# Patient Record
Sex: FEMALE | Race: WHITE | NOT HISPANIC OR LATINO | Employment: OTHER | URBAN - NONMETROPOLITAN AREA
[De-identification: names, ages, dates, MRNs, and addresses within clinical notes are randomized per-mention and may not be internally consistent; named-entity substitution may affect disease eponyms.]

---

## 2017-07-17 ENCOUNTER — TELEPHONE (OUTPATIENT)
Dept: OBSTETRICS AND GYNECOLOGY | Facility: CLINIC | Age: 67
End: 2017-07-17

## 2017-07-17 RX ORDER — BUPROPION HYDROCHLORIDE 300 MG/1
TABLET ORAL
Qty: 30 TABLET | Refills: 2 | Status: SHIPPED | OUTPATIENT
Start: 2017-07-17 | End: 2017-10-23 | Stop reason: SDUPTHER

## 2017-07-17 RX ORDER — BUPROPION HYDROCHLORIDE 300 MG/1
300 TABLET ORAL DAILY
Qty: 30 TABLET | Refills: 2 | Status: SHIPPED | OUTPATIENT
Start: 2017-07-17 | End: 2017-11-17 | Stop reason: SDUPTHER

## 2017-07-17 NOTE — TELEPHONE ENCOUNTER
----- Message from [place] sent at 7/17/2017  8:48 AM EDT -----  Contact: PT  PT REQUESTING REFILLS OF BUPROPION. PT SAYS SHE HAS TO CALL MONTHLY, REQUESTING TO RECEIVE 6 MONTH SUPPLY.     JASON  [phone]  [facility] [place]  BUPROPION

## 2017-10-23 ENCOUNTER — TELEPHONE (OUTPATIENT)
Dept: OBSTETRICS AND GYNECOLOGY | Facility: CLINIC | Age: 67
End: 2017-10-23

## 2017-10-23 NOTE — TELEPHONE ENCOUNTER
----- Message from Kerrie Ga sent at 10/23/2017  2:44 PM EDT -----  Contact: PT  PT IS SCHEDULED WITH DR CHEATHAM ON 11/17/17 FOR MED F/U.  SHE IS OUT OF WELLBUTRIN XL 300MG.  PLEASE SEND IN REFILL TO [facility] IN [place].  THANKS

## 2017-10-24 RX ORDER — BUPROPION HYDROCHLORIDE 300 MG/1
300 TABLET ORAL DAILY
Qty: 30 TABLET | Refills: 1 | Status: SHIPPED | OUTPATIENT
Start: 2017-10-24 | End: 2017-11-17 | Stop reason: SDUPTHER

## 2017-11-17 ENCOUNTER — OFFICE VISIT (OUTPATIENT)
Dept: OBSTETRICS AND GYNECOLOGY | Facility: CLINIC | Age: 67
End: 2017-11-17

## 2017-11-17 VITALS
WEIGHT: 144 LBS | DIASTOLIC BLOOD PRESSURE: 70 MMHG | HEIGHT: 63 IN | SYSTOLIC BLOOD PRESSURE: 132 MMHG | BODY MASS INDEX: 25.52 KG/M2

## 2017-11-17 DIAGNOSIS — N95.1 MENOPAUSAL SYMPTOMS: ICD-10-CM

## 2017-11-17 DIAGNOSIS — R53.82 CHRONIC FATIGUE: ICD-10-CM

## 2017-11-17 DIAGNOSIS — Z01.419 ENCOUNTER FOR GYNECOLOGICAL EXAMINATION WITHOUT ABNORMAL FINDING: ICD-10-CM

## 2017-11-17 DIAGNOSIS — L65.9 ALOPECIA: ICD-10-CM

## 2017-11-17 DIAGNOSIS — Z12.31 ENCOUNTER FOR SCREENING MAMMOGRAM FOR BREAST CANCER: Primary | ICD-10-CM

## 2017-11-17 PROCEDURE — 99397 PER PM REEVAL EST PAT 65+ YR: CPT | Performed by: OBSTETRICS & GYNECOLOGY

## 2017-11-17 RX ORDER — ESTRADIOL 0.1 MG/G
2 CREAM VAGINAL DAILY
COMMUNITY
End: 2021-06-16 | Stop reason: SDUPTHER

## 2017-11-17 RX ORDER — SIMVASTATIN 20 MG
TABLET ORAL
Refills: 0 | COMMUNITY
Start: 2017-09-19 | End: 2018-08-30

## 2017-11-17 RX ORDER — BUPROPION HYDROCHLORIDE 300 MG/1
300 TABLET ORAL DAILY
Qty: 90 TABLET | Refills: 3 | Status: SHIPPED | OUTPATIENT
Start: 2017-11-17 | End: 2018-08-30

## 2017-11-17 NOTE — PROGRESS NOTES
Subjective  Chief Complaint   Patient presents with   • Gynecologic Exam     Patient needs refill on Buproprion.      Patient is 67 y.o.  here for annual examination.  Pt had pap smear last year which was normal; does not want to do this year.  Pt is due for MMG; did last year in [place] 10/31/2016; pt wants to do here this year.  Pt doing well with wellbutrin for menopausal symptoms; wants to continue.  Pt also using estrogen cream without any complaints.  Pt does have complaints of recent hair loss, fatigue.  Pt would like to have routine labs checked.  Pt see's Dr. Danielle in [place].  Pt had low vit d level in past.  Pt could not tolerate high dose vit d.  Pt participates in ovarian cancer screening at ; still has one ovary.    History  Past Medical History:   Diagnosis Date   • Diabetes    • GERD (gastroesophageal reflux disease)    • Heart disease    • Normal vaginal Papanicolaou smear in patient with history of hysterectomy 10/17/2016   • Osteoarthritis    • Osteoporosis      Current Outpatient Prescriptions on File Prior to Visit   Medication Sig Dispense Refill   • [DISCONTINUED] buPROPion XL (WELLBUTRIN XL) 300 MG 24 hr tablet Take 1 tablet by mouth Daily. 30 tablet 1   • [DISCONTINUED] buPROPion XL (WELLBUTRIN XL) 300 MG 24 hr tablet Take 1 tablet by mouth Daily. 30 tablet 2     No current facility-administered medications on file prior to visit.      No Known Allergies  Past Surgical History:   Procedure Laterality Date   • HYSTERECTOMY     • NOSE SURGERY       Family History   Problem Relation Age of Onset   • Diabetes Father    • Osteoporosis Mother      Social History     Social History   • Marital status:      Spouse name: N/A   • Number of children: N/A   • Years of education: N/A     Social History Main Topics   • Smoking status: Never Smoker   • Smokeless tobacco: Never Used   • Alcohol use No   • Drug use: No   • Sexual activity: Defer     Other Topics Concern   • None     Social  "History Narrative     Review of Systems  All systems were reviewed and negative except for:  ENT:  positive for ear ringing and hearing loss  Gastrointestinal: postitive for  constipation  Musculoskeletal: positive for  joint pain  Behavioral/Psych: positive for  unstable mood  Endocrine: positive for  hot flashes and excessive thirst     Objective  Vitals:    11/17/17 1029   BP: 132/70   Weight: 144 lb (65.3 kg)   Height: 63\" (160 cm)     Physical Exam:  General Appearance: alert, appears stated age and cooperative  Head: normocephalic, without obvious abnormality and atraumatic  Eyes: lids and lashes normal, conjunctivae and sclerae normal, no icterus, no pallor, corneas clear and PERRLA  Ears: ears appear intact with no abnormalities noted  Nose: nares normal, septum midline, mucosa normal and no drainage  Neck: suppple, trachea midline and no thyromegaly  Lungs: clear to auscultation, respirations regular, respirations even and respirations unlabored  Heart: regular rhythm and normal rate, normal S1, S2, no murmur, gallop, or rubs and no click  Breasts: Not performed; pt declines.  Abdomen: normal bowel sounds, no masses, no hepatomegaly, no splenomegaly, soft non-tender, no guarding and no rebound tenderness  Pelvic: Not performed; pt declines; had ovarian cancer screening.  Extremities: moves extremities well, no edema, no cyanosis and no redness  Skin: no bleeding, bruising or rash and no lesions noted  Lymph Nodes: no palpable adenopathy  Psych: normal mood and affect, oriented to person, time and place, thought content organized and appropriate judgment    Lab Review   No data reviewed    Imaging   Mammogram report    Assessment/Plan    Problem List Items Addressed This Visit     None      Visit Diagnoses     Encounter for screening mammogram for breast cancer    -  Primary  It is recommended per ACOG, for women at average risk to start annual mammogram screening at the age of 40 until the age of 75 and an " individualized decision be made for women after age 75.  She was encouraged to continue getting yearly mammograms.  She should report any palpable breast lump(s) or skin changes regardless of mammographic findings.  I explained to Madelaine that notification regarding her mammogram results will come from the center performing the study.  Our office will not be routinely calling with mammogram results.  It is her responsibility to make sure that the results from the mammogram are communicated to her by the breast center.  If she has any questions about the results, she is welcome to call our office anytime.  MMG ordered.    Relevant Orders    Mammo Screening Digital Tomosynthesis Bilateral With CAD    Comprehensive Metabolic Panel    Thyroid Panel With TSH    Alopecia      Order given for following labs.    Relevant Orders    Thyroid Panel With TSH    Chronic fatigue      Order given for following labs.    Relevant Medications    buPROPion XL (WELLBUTRIN XL) 300 MG 24 hr tablet    Other Relevant Orders    CBC & Differential    Comprehensive Metabolic Panel    Thyroid Panel With TSH    Encounter for gynecological examination without abnormal finding     I explained to Madelaine that Pap smears are no longer recommended in patients after 65 years of age who have had adequate negative screening with three consecutive negative pap smears within the previous 10 years and the most recent test performed within the past 5 years. Women who have a history of IZABELLA 2, IZABELLA 3, or ACIS should continue routine screening for a total of 20 years after regression or treatment.   I stressed to Madelaine that she still should be seen yearly for a full physical including breast and pelvic exam.  I informed her that women aged 65 and older still do get cervical cancer representing 14.1% of the [place] female population and 19.6% of new cases of cervical cancer.  I also informed the patient regarding medicare guidelines on coverage for pap smear  screening.  For this reason, Madelaine has declined pap smear screening this year.     Menopausal symptoms      Patient desires to continue Wellbutrin as given.  Pt also continues estrogen cream; does not need refill at this time.    Relevant Medications    estradiol (ESTRACE) 0.1 MG/GM vaginal cream    buPROPion XL (WELLBUTRIN XL) 300 MG 24 hr tablet            Follow up 1 year or prn     This note was electronically signed.  Lu Dent M.D.

## 2018-08-30 ENCOUNTER — OFFICE VISIT (OUTPATIENT)
Dept: OBSTETRICS AND GYNECOLOGY | Facility: CLINIC | Age: 68
End: 2018-08-30

## 2018-08-30 VITALS
BODY MASS INDEX: 24.1 KG/M2 | WEIGHT: 136 LBS | DIASTOLIC BLOOD PRESSURE: 74 MMHG | SYSTOLIC BLOOD PRESSURE: 130 MMHG | HEIGHT: 63 IN

## 2018-08-30 DIAGNOSIS — R63.0 LOSS OF APPETITE: ICD-10-CM

## 2018-08-30 DIAGNOSIS — Z86.39 PERSONAL HISTORY OF OTHER ENDOCRINE, NUTRITIONAL AND METABOLIC DISEASE: ICD-10-CM

## 2018-08-30 DIAGNOSIS — L65.9 ALOPECIA: Primary | ICD-10-CM

## 2018-08-30 DIAGNOSIS — R63.4 WEIGHT LOSS: ICD-10-CM

## 2018-08-30 DIAGNOSIS — K59.00 CONSTIPATION, UNSPECIFIED CONSTIPATION TYPE: ICD-10-CM

## 2018-08-30 DIAGNOSIS — R53.83 OTHER FATIGUE: ICD-10-CM

## 2018-08-30 PROCEDURE — 99214 OFFICE O/P EST MOD 30 MIN: CPT | Performed by: OBSTETRICS & GYNECOLOGY

## 2018-08-30 RX ORDER — SIMVASTATIN 20 MG
TABLET ORAL
COMMUNITY
End: 2019-09-25

## 2018-08-30 RX ORDER — BUPROPION HYDROCHLORIDE 300 MG/1
TABLET ORAL
COMMUNITY
End: 2018-12-31 | Stop reason: SDUPTHER

## 2018-08-30 NOTE — PROGRESS NOTES
Subjective  Chief Complaint   Patient presents with   • Gynecologic Exam     PROBLEM WITH HORMONES. HAIR LOSS AND HOT FLASHES SINCE      Patient is 68 y.o.  here for evaluation of hair loss and hot flashes.  Pt was severely ill in Feb; tested positive for influenza but did not receive any medication.  Pt reports having the acute onset of hair loss in .  Pt has seen primary care physician.  Pt had labs done which are noted and reviewed today.  Pt had labs done 2018  CBC normal, MG 1.9; T4 10.80; TSH 2.85; vit B12 >1000; vit D 64.5.; 2/15/2018; WBC normal, BMP normal; cholesterol 190; ; ; TSH 4.80 (high).  Pt has also seen dermatologist.  Pt reports both primary care physician and dermatologist feel the hair loss is secondary to the severe illness.  Pt reports she was near death.  Pt has also had the onset of hot flashes during this time.  Pt has not had menses since hysterectomy.  Pt has not been on any HRT for many years and doing well.  Pt also reports during this time extreme fatigue.  Pt also reports loss of appetite and weight loss.  Pt reports having weight loss prior to illness.  Pt thinks she has lost 10-20 lbs.  Pt is on vit D; has history of low levels in the past.  Pt denies any other supplements.  Pt has not been on any vit b 12 supplements.  Pt also has complaints of constipation.  Pt reports going days without a bm.  Pt reports noticing a change with bms as well during this time.  Pt has not had a recent colonoscopy.  Pt does have a history of diverticulosis and diverticulitis.  Pt is concerned regarding diabetes as well given weight loss and fatigue.  Pt does have family history of diabetes.    History  Past Medical History:   Diagnosis Date   • Diabetes (CMS/[facility])    • GERD (gastroesophageal reflux disease)    • Heart disease    • Normal vaginal Papanicolaou smear in patient with history of hysterectomy 10/17/2016   • Osteoarthritis    • Osteoporosis      Current  "Outpatient Prescriptions on File Prior to Visit   Medication Sig Dispense Refill   • cholecalciferol (VITAMIN D3) 1000 units tablet Take 1,000 Units by mouth Daily.     • estradiol (ESTRACE) 0.1 MG/GM vaginal cream Insert 2 g into the vagina Daily.     • [DISCONTINUED] buPROPion XL (WELLBUTRIN XL) 300 MG 24 hr tablet Take 1 tablet by mouth Daily. 90 tablet 3   • [DISCONTINUED] simvastatin (ZOCOR) 20 MG tablet 1 tablet in the evening Once a day Orally 90 days  0     No current facility-administered medications on file prior to visit.      No Known Allergies  Past Surgical History:   Procedure Laterality Date   • HYSTERECTOMY     • NOSE SURGERY       Family History   Problem Relation Age of Onset   • Diabetes Father    • Osteoporosis Mother      Social History     Social History   • Marital status:      Social History Main Topics   • Smoking status: Never Smoker   • Smokeless tobacco: Never Used   • Alcohol use No   • Drug use: No   • Sexual activity: Defer     Other Topics Concern   • Not on file     Review of Systems  All systems were reviewed and negative except for:  Constitution:  positive for anorexia, fatigue, malaise and weight loss  Gastrointestinal: postitive for  constipation  Endocrine: positive for  hair changes, hot flashes and weight loss, unintended     Objective  Vitals:    08/30/18 1100   BP: 130/74   Weight: 61.7 kg (136 lb)   Height: 160 cm (63\")     Physical Exam:  General Appearance: alert, appears stated age and cooperative  Head: normocephalic, without obvious abnormality and atraumatic  Eyes: lids and lashes normal, conjunctivae and sclerae normal, no icterus, no pallor, corneas clear and PERRLA  Ears: ears appear intact with no abnormalities noted  Nose: nares normal, septum midline, mucosa normal and no drainage  Neck: suppple, trachea midline and no thyromegaly  Lungs: clear to auscultation, respirations regular, respirations even and respirations unlabored  Heart: regular rhythm and " normal rate, normal S1, S2, no murmur, gallop, or rubs and no click  Breasts: Not performed.  Abdomen: normal bowel sounds, no masses, no hepatomegaly, no splenomegaly, soft non-tender, no guarding and no rebound tenderness  Pelvic: Not performed.  Extremities: moves extremities well, no edema, no cyanosis and no redness  Skin: no bleeding, bruising or rash and no lesions noted  Lymph Nodes: no palpable adenopathy  Neuro: CN II-X grossly intact; sensation intact  Psych: normal mood and affect, oriented to person, time and place, thought content organized and appropriate judgment  Lab Review   labs as noted    Imaging   No data reviewed    Assessment/Plan  Problem List Items Addressed This Visit     None      Visit Diagnoses     Alopecia    -  Primary  Will check following labs today.  Pt did have slightly elevated TSH in Feb.  If abnormal then refer to endocrinology.  Plan pending results.    Relevant Orders    Thyroid Panel With TSH    Thyroid Peroxidase Antibody    Vitamin B12    Folate    CBC (No Diff)    Basic Metabolic Panel    Hemoglobin A1c    Follicle Stimulating Hormone    Estradiol    Testosterone, Free, Total    Other fatigue      Will obtain following labs as noted.  Plan pending results.    Relevant Orders    Thyroid Panel With TSH    Thyroid Peroxidase Antibody    Vitamin B12    Folate    CBC (No Diff)    Basic Metabolic Panel    Hemoglobin A1c    Follicle Stimulating Hormone    Estradiol    Testosterone, Free, Total    Constipation, unspecified constipation type      Labs as noted.  Pt given instructions and precautions.  If continued symptoms then refer to GI for further evaluation.    Relevant Orders    Thyroid Panel With TSH    Thyroid Peroxidase Antibody    Vitamin B12    Folate    CBC (No Diff)    Basic Metabolic Panel    Hemoglobin A1c    Follicle Stimulating Hormone    Estradiol    Testosterone, Free, Total    Weight loss      See plan above.    Relevant Orders    Thyroid Panel With TSH     Thyroid Peroxidase Antibody    Vitamin B12    Folate    CBC (No Diff)    Basic Metabolic Panel    Hemoglobin A1c    Follicle Stimulating Hormone    Estradiol    Testosterone, Free, Total    Loss of appetite      See plan above.    Relevant Orders    Thyroid Panel With TSH    Thyroid Peroxidase Antibody    Vitamin B12    Folate    CBC (No Diff)    Basic Metabolic Panel    Hemoglobin A1c    Follicle Stimulating Hormone    Estradiol    Testosterone, Free, Total    Personal history of other endocrine, nutritional and metabolic disease       Pt with previous abnormal TSH as well as vit b 12 levels.  Will repeat following labs today and plan pending results.    Relevant Orders    Hemoglobin A1c        Total time spent today with Madelaine  was 30 minutes (level 4).  Greater than 50% of the time was spent face to face coordinating and planning care, answering her questions and counseling regarding pathophysiology of her presenting problem along with plans for any diagnositc work-up and treatment.  Follow up as discussed   This note was electronically signed.  Lu Dent M.D.

## 2018-09-01 LAB
BUN SERPL-MCNC: 13 MG/DL (ref 7–20)
BUN/CREAT SERPL: 18.6 (ref 7.1–23.5)
CALCIUM SERPL-MCNC: 9.9 MG/DL (ref 8.4–10.2)
CHLORIDE SERPL-SCNC: 101 MMOL/L (ref 98–107)
CO2 SERPL-SCNC: 27 MMOL/L (ref 26–30)
CREAT SERPL-MCNC: 0.7 MG/DL (ref 0.6–1.3)
ERYTHROCYTE [DISTWIDTH] IN BLOOD BY AUTOMATED COUNT: 12.8 % (ref 11.5–14.5)
ESTRADIOL SERPL-MCNC: <5 PG/ML
FOLATE SERPL-MCNC: >20 NG/ML
FSH SERPL-ACNC: 104.3 MIU/ML
FT4I SERPL CALC-MCNC: 1.9 (ref 1.2–4.9)
GLUCOSE SERPL-MCNC: 88 MG/DL (ref 74–98)
HBA1C MFR BLD: 5.5 %
HCT VFR BLD AUTO: 38.6 % (ref 37–47)
HGB BLD-MCNC: 12.9 G/DL (ref 12–16)
MCH RBC QN AUTO: 29 PG (ref 27–31)
MCHC RBC AUTO-ENTMCNC: 33.4 G/DL (ref 30–37)
MCV RBC AUTO: 86.7 FL (ref 81–99)
PLATELET # BLD AUTO: 309 10*3/MM3 (ref 130–400)
POTASSIUM SERPL-SCNC: 4.7 MMOL/L (ref 3.5–5.1)
RBC # BLD AUTO: 4.45 10*6/MM3 (ref 4.2–5.4)
SODIUM SERPL-SCNC: 140 MMOL/L (ref 137–145)
T3RU NFR SERPL: 20 % (ref 24–39)
T4 SERPL-MCNC: 9.4 UG/DL (ref 4.5–12)
TESTOST FREE SERPL-MCNC: 0.3 PG/ML (ref 0–4.2)
TESTOST SERPL-MCNC: <3 NG/DL (ref 3–41)
THYROPEROXIDASE AB SERPL-ACNC: 16 IU/ML (ref 0–34)
TSH SERPL DL<=0.005 MIU/L-ACNC: 2.76 UIU/ML (ref 0.45–4.5)
VIT B12 SERPL-MCNC: >1000 PG/ML (ref 239–931)
WBC # BLD AUTO: 5.69 10*3/MM3 (ref 4.8–10.8)

## 2019-01-02 RX ORDER — BUPROPION HYDROCHLORIDE 300 MG/1
300 TABLET ORAL EVERY MORNING
Qty: 30 TABLET | Refills: 0 | Status: SHIPPED | OUTPATIENT
Start: 2019-01-02 | End: 2019-01-25 | Stop reason: SDUPTHER

## 2019-01-25 ENCOUNTER — OFFICE VISIT (OUTPATIENT)
Dept: OBSTETRICS AND GYNECOLOGY | Facility: CLINIC | Age: 69
End: 2019-01-25

## 2019-01-25 VITALS
BODY MASS INDEX: 24.98 KG/M2 | SYSTOLIC BLOOD PRESSURE: 126 MMHG | DIASTOLIC BLOOD PRESSURE: 66 MMHG | WEIGHT: 141 LBS | HEIGHT: 63 IN

## 2019-01-25 DIAGNOSIS — Z01.419 ENCOUNTER FOR GYNECOLOGICAL EXAMINATION WITHOUT ABNORMAL FINDING: Primary | ICD-10-CM

## 2019-01-25 DIAGNOSIS — Z12.31 ENCOUNTER FOR SCREENING MAMMOGRAM FOR BREAST CANCER: ICD-10-CM

## 2019-01-25 DIAGNOSIS — N95.1 MENOPAUSAL SYMPTOMS: ICD-10-CM

## 2019-01-25 PROCEDURE — 99397 PER PM REEVAL EST PAT 65+ YR: CPT | Performed by: OBSTETRICS & GYNECOLOGY

## 2019-01-25 RX ORDER — BUPROPION HYDROCHLORIDE 300 MG/1
300 TABLET ORAL EVERY MORNING
Qty: 90 TABLET | Refills: 3 | Status: SHIPPED | OUTPATIENT
Start: 2019-01-25 | End: 2020-01-23 | Stop reason: SDUPTHER

## 2019-01-25 NOTE — PROGRESS NOTES
Subjective  Chief Complaint   Patient presents with   • Gynecologic Exam     Patient is 68 y.o.  here for annual examination.  Patient also here for follow-up of her Wellbutrin.  The patient had her last mammogram in February of last year.  Patient did her mammogram in [place].  Patient denies any vaginal bleeding or spotting.  Patient has had previous hysterectomy.  Patient is using her estrogen cream as previously given.  Patient is also currently on Wellbutrin.  Patient reports having improvement in her moods mood swings as well as menopausal symptoms.  Patient would like to continue the current medication.  The patient had previously been seen with significant hair loss.  The patient reports this is improved since her last visit.  Patient denies any problems or changes in health.  Patient sees Dr. Danielle in [place].    History  Past Medical History:   Diagnosis Date   • Diabetes (CMS/[facility])    • GERD (gastroesophageal reflux disease)    • Heart disease    • Normal vaginal Papanicolaou smear in patient with history of hysterectomy 10/17/2016   • Osteoarthritis    • Osteoporosis      Current Outpatient Medications on File Prior to Visit   Medication Sig Dispense Refill   • cholecalciferol (VITAMIN D3) 1000 units tablet Take 1,000 Units by mouth Daily.     • estradiol (ESTRACE) 0.1 MG/GM vaginal cream Insert 2 g into the vagina Daily.     • simvastatin (ZOCOR) 20 MG tablet simvastatin 20 mg tablet       No current facility-administered medications on file prior to visit.      No Known Allergies  Past Surgical History:   Procedure Laterality Date   • HYSTERECTOMY     • NOSE SURGERY       Family History   Problem Relation Age of Onset   • Diabetes Father    • Osteoporosis Mother      Social History     Socioeconomic History   • Marital status:      Spouse name: Not on file   • Number of children: Not on file   • Years of education: Not on file   • Highest education level: Not on file   Tobacco Use   •  "Smoking status: Never Smoker   • Smokeless tobacco: Never Used   Substance and Sexual Activity   • Alcohol use: No   • Drug use: No   • Sexual activity: Defer     Review of Systems  The following systems were reviewed and negative:  constitution, eyes, ENT, respiratory, cardiovascular, gastrointestinal, genitourinary, integument, breast, hematologic / lymphatic, musculoskeletal, neurological, behavioral/psych, endocrine and allergies / immunologic     Objective  Vitals:    01/25/19 1147   BP: 126/66   Weight: 64 kg (141 lb)   Height: 160 cm (63\")     Physical Exam:  General Appearance: alert, appears stated age and cooperative  Head: normocephalic, without obvious abnormality and atraumatic  Eyes: lids and lashes normal, conjunctivae and sclerae normal, no icterus, no pallor, corneas clear and PERRLA  Ears: ears appear intact with no abnormalities noted  Nose: nares normal, septum midline, mucosa normal and no drainage  Neck: suppple, trachea midline and no thyromegaly  Lungs: clear to auscultation, respirations regular, respirations even and respirations unlabored  Heart: regular rhythm and normal rate, normal S1, S2, no murmur, gallop, or rubs and no click  Breasts: Examined in supine position  Symmetric without masses or skin dimpling  Nipples normal without inversion, lesions or discharge  There are no palpable axillary nodes  Abdomen: normal bowel sounds, no masses, no hepatomegaly, no splenomegaly, soft non-tender, no guarding and no rebound tenderness  Pelvic: Clinical staff was present for exam  External genitalia:  normal appearance of the external genitalia including Bartholin's and Devers's glands.  :  urethral meatus normal;  Vaginal:  atrophic mucosal changes are present;  Cervix:  absent.  Uterus:  absent.  Adnexa:  non palpable bilaterally.  Pap smear done and specimen sent using Thin-Prep technique  Extremities: moves extremities well, no edema, no cyanosis and no redness  Skin: no bleeding, " bruising or rash and no lesions noted  Lymph Nodes: no palpable adenopathy  Neuro: CN II-X grossly intact; sensation intact  Psych: normal mood and affect, oriented to person, time and place, thought content organized and appropriate judgment  Lab Review   No data reviewed    Imaging   Mammogram report    Assessment/Plan  Problem List Items Addressed This Visit     None      Visit Diagnoses     Encounter for gynecological examination without abnormal finding    -  Primary  I explained to Madelaine that Pap smears are no longer recommended in patients after 65 years of age who have had adequate negative screening with three consecutive negative pap smears within the previous 10 years and the most recent test performed within the past 5 years. Women who have a history of IZABELLA 2, IZABELLA 3, or ACIS should continue routine screening for a total of 20 years after regression or treatment.   I stressed to Madelaine that she still should be seen yearly for a full physical including breast and pelvic exam.  I informed her that women aged 65 and older still do get cervical cancer representing 14.1% of the [place] female population and 19.6% of new cases of cervical cancer.  I also informed the patient regarding medicare guidelines on coverage for pap smear screening.  For this reason, Madelaine has elected pap smear screening this year.    Relevant Orders    Pap IG, Rfx HPV ASCU    Encounter for screening mammogram for breast cancer      It is recommended per ACOG, for women at average risk to start annual mammogram screening at the age of 40 until the age of 75 and an individualized decision be made for women after age 75.  She was encouraged to continue getting yearly mammograms.  She should report any palpable breast lump(s) or skin changes regardless of mammographic findings.  I explained to Madelaine that notification regarding her mammogram results will come from the center performing the study.  Our office will not be routinely calling  with mammogram results.  It is her responsibility to make sure that the results from the mammogram are communicated to her by the breast center.  If she has any questions about the results, she is welcome to call our office anytime.  Pt to schedule.    Madelaine was counseled regarding having clinical breast exams and breast self-awareness.  Women aged 29-39 years of age should have clinical breast exams every 1-3 years and yearly aged 40 and older.  The patient was counseled regarding breast self-awareness focusing on having a sense of what is normal for her breasts so that she can tell if there are changes.  Even small changes should be reported to provider.    Relevant Orders    Mammo Screening Digital Tomosynthesis Bilateral With CAD    Menopausal symptoms    Improved  Pt desires to continue current medication.  rx given as noted; instructions and precautions given.    Relevant Medications    buPROPion XL (WELLBUTRIN XL) 300 MG 24 hr tablet        Follow up as discussed/scheduled  This note was electronically signed.  Lu Dent M.D.

## 2019-02-01 DIAGNOSIS — Z01.419 ENCOUNTER FOR GYNECOLOGICAL EXAMINATION WITHOUT ABNORMAL FINDING: ICD-10-CM

## 2019-03-12 ENCOUNTER — HOSPITAL ENCOUNTER (OUTPATIENT)
Dept: MAMMOGRAPHY | Facility: HOSPITAL | Age: 69
Discharge: HOME | End: 2019-03-12
Attending: OBSTETRICS & GYNECOLOGY | Admitting: OBSTETRICS & GYNECOLOGY

## 2019-03-12 DIAGNOSIS — Z12.31 ENCOUNTER FOR SCREENING MAMMOGRAM FOR BREAST CANCER: ICD-10-CM

## 2019-03-12 PROCEDURE — 77067 SCR MAMMO BI INCL CAD: CPT

## 2019-03-12 PROCEDURE — 77063 BREAST TOMOSYNTHESIS BI: CPT

## 2019-09-25 ENCOUNTER — CONSULT (OUTPATIENT)
Dept: CARDIOLOGY | Facility: CLINIC | Age: 69
End: 2019-09-25

## 2019-09-25 VITALS
HEART RATE: 90 BPM | BODY MASS INDEX: 23.74 KG/M2 | HEIGHT: 63 IN | SYSTOLIC BLOOD PRESSURE: 142 MMHG | DIASTOLIC BLOOD PRESSURE: 76 MMHG | WEIGHT: 134 LBS

## 2019-09-25 DIAGNOSIS — I10 ESSENTIAL HYPERTENSION: ICD-10-CM

## 2019-09-25 DIAGNOSIS — E78.2 MIXED HYPERLIPIDEMIA: ICD-10-CM

## 2019-09-25 DIAGNOSIS — R06.09 DYSPNEA ON EXERTION: Primary | ICD-10-CM

## 2019-09-25 PROCEDURE — 99203 OFFICE O/P NEW LOW 30 MIN: CPT | Performed by: INTERNAL MEDICINE

## 2019-09-25 RX ORDER — LISINOPRIL 10 MG/1
10 TABLET ORAL DAILY
COMMUNITY

## 2019-09-25 RX ORDER — SIMVASTATIN 10 MG
10 TABLET ORAL NIGHTLY
COMMUNITY

## 2019-09-25 NOTE — PROGRESS NOTES
[facility] at [facility]  Consultation H&P  Madelaine Brown  1950  [address] [place] [id number]     VISIT DATE:  09/25/19    PCP: Evaristo Danielle MD  [address] [place], [place] [id number]    IDENTIFICATION: A 69 y.o. 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 HTN  2. HLD, on simvastatin HDL 72, LDL 92  3. Vitamin D deficiency  4. Osteoporosis  5. OA  6. GERD  7. Depression  8. Surgical history:  1. Oophorectomy  2. Hysterectomy  3. No surgery    Allergies  No Known Allergies    Current Medications    Current Outpatient Medications:   •  buPROPion XL (WELLBUTRIN XL) 300 MG 24 hr tablet, Take 1 tablet by mouth Every Morning., Disp: 90 tablet, Rfl: 3  •  Cholecalciferol (VITAMIN D3) 5000 units tablet tablet, Take 5,000 Units by mouth Daily., Disp: , Rfl:   •  estradiol (ESTRACE) 0.1 MG/GM vaginal cream, Insert 2 g into the vagina Daily., Disp: , Rfl:   •  lisinopril (PRINIVIL,ZESTRIL) 10 MG tablet, Take 10 mg by mouth Daily., Disp: , Rfl:   •  MAGNESIUM PO, Take 330 mg by mouth Daily., Disp: , Rfl:   •  Multiple Vitamins-Minerals (HAIR SKIN AND NAILS FORMULA PO), Take  by mouth Daily., Disp: , Rfl:   •  Probiotic Product (PROBIOTIC PO), Take  by mouth Daily., Disp: , Rfl:   •  simvastatin (ZOCOR) 10 MG tablet, Take 10 mg by mouth Every Night., Disp: , Rfl:      History of Present Illness   HPI  Madelaine Brown is a 69 y.o. year old female with the above mentioned PMH who presents for consult from PCP Dr. Danielle for evaluation of HTN.    Patient most recently seen primary care provider and was noted with hypertension.  She had followed at home and ultimately initiation of ACE inhibitor was undertaken.  She states that she has changed her diet of significant decrease of sugar intake and is felt much better and had relatively good control systolic pressures of 1 10-1 50 range.  She was taking  regular nonsteroidal anti-inflammatory and decongestants form of phenylephrine but has greatly decreased such as initiation of blood pressure medication she is active with no exercise-induced symptoms that she can state.  She has a significant family history of precocious atherosclerosis and is wanting reassurance regarding her cardiovascular risk    ROS  Review of Systems   Constitution: Negative for chills, fever, weakness, malaise/fatigue, night sweats, weight gain and weight loss.   HENT: Negative for hearing loss and nosebleeds.    Eyes: Negative for blurred vision, vision loss in left eye, vision loss in right eye, visual disturbance and visual halos.   Cardiovascular: Negative for chest pain, claudication, cyanosis, dyspnea on exertion, irregular heartbeat, leg swelling, near-syncope, orthopnea, palpitations, paroxysmal nocturnal dyspnea and syncope.   Respiratory: Negative for cough, hemoptysis, shortness of breath, snoring and wheezing.    Endocrine: Negative for cold intolerance, heat intolerance, polydipsia, polyphagia and polyuria.   Hematologic/Lymphatic: Negative for adenopathy and bleeding problem. Does not bruise/bleed easily.   Skin: Negative for dry skin, poor wound healing and rash.   Musculoskeletal: Negative for falls, joint pain, joint swelling, muscle cramps, muscle weakness, myalgias and neck pain.   Gastrointestinal: Negative for bloating, abdominal pain, change in bowel habit, bowel incontinence, constipation, diarrhea, dysphagia, excessive appetite, heartburn, hematemesis, hematochezia, jaundice, melena, nausea and vomiting.   Genitourinary: Negative for bladder incontinence, dysuria, flank pain, hematuria, hesitancy and nocturia.   Neurological: Negative for aphonia, excessive daytime sleepiness, dizziness, focal weakness, headaches, light-headedness, loss of balance, seizures, sensory change, tremors and vertigo.   Psychiatric/Behavioral: Negative for altered mental status, depression,  "memory loss, substance abuse and suicidal ideas. The patient is not nervous/anxious.    All other systems reviewed and are negative.      SOCIAL HX  Social History     Socioeconomic History   • Marital status:      Spouse name: Not on file   • Number of children: Not on file   • Years of education: Not on file   • Highest education level: Not on file   Tobacco Use   • Smoking status: Never Smoker   • Smokeless tobacco: Never Used   Substance and Sexual Activity   • Alcohol use: Yes     Comment: rarely   • Drug use: No   • Sexual activity: Defer       FAMILY HX  Family History   Problem Relation Age of Onset   • Diabetes Father    • Heart attack Father    • Osteoporosis Mother        Vitals:    09/25/19 1336   BP: 142/76   BP Location: Left arm   Patient Position: Sitting   Pulse: 90   Weight: 60.8 kg (134 lb)   Height: 160 cm (63\")       PHYSICAL EXAMINATION:  Physical Exam   Constitutional: She is oriented to person, place, and time. She appears well-developed and well-nourished. No distress.   HENT:   Head: Normocephalic and atraumatic.   Nose: Nose normal.   Mouth/Throat: Uvula is midline, oropharynx is clear and moist and mucous membranes are normal.   Eyes: Conjunctivae and EOM are normal. Pupils are equal, round, and reactive to light. No scleral icterus.   Neck: Normal range of motion. Neck supple. No hepatojugular reflux and no JVD present. Carotid bruit is not present. No tracheal deviation present. No thyromegaly present.   Cardiovascular: Normal rate, regular rhythm, S1 normal, S2 normal, intact distal pulses and normal pulses. PMI is not displaced. Exam reveals no gallop, no distant heart sounds, no friction rub, no midsystolic click and no opening snap.   No murmur heard.  Pulses:       Radial pulses are 2+ on the right side, and 2+ on the left side.        Dorsalis pedis pulses are 2+ on the right side, and 2+ on the left side.        Posterior tibial pulses are 2+ on the right side, and 2+ on " the left side.   Pulmonary/Chest: Effort normal and breath sounds normal. She has no wheezes. She has no rhonchi. She has no rales.   Abdominal: Soft. Bowel sounds are normal. She exhibits no mass. There is no tenderness. There is no guarding.   Musculoskeletal: She exhibits no edema or tenderness.   Lymphadenopathy:     She has no cervical adenopathy.   Neurological: She is alert and oriented to person, place, and time.   Skin: Skin is warm, dry and intact. No rash noted. No cyanosis or erythema. Nails show no clubbing.   Psychiatric: She has a normal mood and affect. Her behavior is normal.   Nursing note and vitals reviewed.      Diagnostic Data:  Procedures  No results found for: CHLPL, TRIG, HDL, LDLDIRECT  Lab Results   Component Value Date    BUN 13 08/30/2018    CREATININE 0.70 08/30/2018     08/30/2018    K 4.7 08/30/2018     08/30/2018    CO2 27.0 08/30/2018     Lab Results   Component Value Date    HGBA1C 5.50 08/30/2018     Lab Results   Component Value Date    WBC 5.69 08/30/2018    HGB 12.9 08/30/2018    HCT 38.6 08/30/2018     08/30/2018       ASSESSMENT:   Diagnosis Plan   1. Dyspnea on exertion  Adult Transthoracic Echo Complete W/ Cont if Necessary Per Protocol   2. Mixed hyperlipidemia     3. Essential hypertension           PLAN:  Dyspnea on exertion we will document echocardiogram for filling pressure systolic diastolic function as well as screen for hypertensive heart disease    Dyslipidemia controlled on statin therapy    Hypertension controlled on low-dose lisinopril discussed pathogenesis of essential hypertension with her at this time.  Commended her on change of to anti-inflammatory diet and discontinuation of regular use of nonsteroidal anti-inflammatory and decongestant formulations    Recommendation preventive care 150 minutes of aerobic exercise weekly and reiterated the impact of lowered dementia risk with such as well    Antione Austin MD, FACC

## 2019-10-14 ENCOUNTER — APPOINTMENT (OUTPATIENT)
Dept: CARDIOLOGY | Facility: HOSPITAL | Age: 69
End: 2019-10-14

## 2019-10-16 ENCOUNTER — HOSPITAL ENCOUNTER (OUTPATIENT)
Dept: CARDIOLOGY | Facility: HOSPITAL | Age: 69
Discharge: HOME | End: 2019-10-16
Admitting: INTERNAL MEDICINE

## 2019-10-16 DIAGNOSIS — R06.09 DYSPNEA ON EXERTION: ICD-10-CM

## 2019-10-16 PROCEDURE — 93306 TTE W/DOPPLER COMPLETE: CPT

## 2019-10-16 PROCEDURE — 93306 TTE W/DOPPLER COMPLETE: CPT | Performed by: INTERNAL MEDICINE

## 2019-10-17 LAB
BH CV ECHO MEAS - AO MAX PG (FULL): 2.2 MMHG
BH CV ECHO MEAS - AO MAX PG: 6 MMHG
BH CV ECHO MEAS - AO MEAN PG (FULL): 1 MMHG
BH CV ECHO MEAS - AO MEAN PG: 3 MMHG
BH CV ECHO MEAS - AO ROOT AREA (BSA CORRECTED): 1.9
BH CV ECHO MEAS - AO ROOT AREA: 7.5 CM^2
BH CV ECHO MEAS - AO ROOT DIAM: 3.1 CM
BH CV ECHO MEAS - AO V2 MAX: 125 CM/SEC
BH CV ECHO MEAS - AO V2 MEAN: 81.2 CM/SEC
BH CV ECHO MEAS - AO V2 VTI: 24.5 CM
BH CV ECHO MEAS - ASC AORTA: 3.3 CM
BH CV ECHO MEAS - AVA(I,A): 2.1 CM^2
BH CV ECHO MEAS - AVA(I,D): 2.1 CM^2
BH CV ECHO MEAS - AVA(V,A): 2 CM^2
BH CV ECHO MEAS - AVA(V,D): 2 CM^2
BH CV ECHO MEAS - BSA(HAYCOCK): 1.7 M^2
BH CV ECHO MEAS - BSA: 1.6 M^2
BH CV ECHO MEAS - BZI_BMI: 23.9 KILOGRAMS/M^2
BH CV ECHO MEAS - BZI_METRIC_HEIGHT: 160 CM
BH CV ECHO MEAS - BZI_METRIC_WEIGHT: 61.2 KG
BH CV ECHO MEAS - EDV(CUBED): 60.2 ML
BH CV ECHO MEAS - EDV(MOD-SP2): 48 ML
BH CV ECHO MEAS - EDV(MOD-SP4): 51 ML
BH CV ECHO MEAS - EDV(TEICH): 66.7 ML
BH CV ECHO MEAS - EF(CUBED): 65.1 %
BH CV ECHO MEAS - EF(MOD-BP): 62 %
BH CV ECHO MEAS - EF(MOD-SP2): 56.3 %
BH CV ECHO MEAS - EF(MOD-SP4): 62.7 %
BH CV ECHO MEAS - EF(TEICH): 57.3 %
BH CV ECHO MEAS - ESV(CUBED): 21 ML
BH CV ECHO MEAS - ESV(MOD-SP2): 21 ML
BH CV ECHO MEAS - ESV(MOD-SP4): 19 ML
BH CV ECHO MEAS - ESV(TEICH): 28.5 ML
BH CV ECHO MEAS - FS: 29.6 %
BH CV ECHO MEAS - IVS/LVPW: 0.85
BH CV ECHO MEAS - IVSD: 0.58 CM
BH CV ECHO MEAS - LA DIMENSION: 2.8 CM
BH CV ECHO MEAS - LA/AO: 0.9
BH CV ECHO MEAS - LAD MAJOR: 3.9 CM
BH CV ECHO MEAS - LAT PEAK E' VEL: 9.6 CM/SEC
BH CV ECHO MEAS - LATERAL E/E' RATIO: 6.3
BH CV ECHO MEAS - LV DIASTOLIC VOL/BSA (35-75): 31.2 ML/M^2
BH CV ECHO MEAS - LV MASS(C)D: 65.6 GRAMS
BH CV ECHO MEAS - LV MASS(C)DI: 40.1 GRAMS/M^2
BH CV ECHO MEAS - LV MAX PG: 3.8 MMHG
BH CV ECHO MEAS - LV MEAN PG: 2 MMHG
BH CV ECHO MEAS - LV SYSTOLIC VOL/BSA (12-30): 11.6 ML/M^2
BH CV ECHO MEAS - LV V1 MAX: 97 CM/SEC
BH CV ECHO MEAS - LV V1 MEAN: 56.3 CM/SEC
BH CV ECHO MEAS - LV V1 VTI: 20 CM
BH CV ECHO MEAS - LVIDD: 3.9 CM
BH CV ECHO MEAS - LVIDS: 2.8 CM
BH CV ECHO MEAS - LVLD AP2: 6.3 CM
BH CV ECHO MEAS - LVLD AP4: 6.8 CM
BH CV ECHO MEAS - LVLS AP2: 5.3 CM
BH CV ECHO MEAS - LVLS AP4: 5.5 CM
BH CV ECHO MEAS - LVOT AREA (M): 2.5 CM^2
BH CV ECHO MEAS - LVOT AREA: 2.5 CM^2
BH CV ECHO MEAS - LVOT DIAM: 1.8 CM
BH CV ECHO MEAS - LVPWD: 0.68 CM
BH CV ECHO MEAS - MED PEAK E' VEL: 6 CM/SEC
BH CV ECHO MEAS - MEDIAL E/E' RATIO: 10
BH CV ECHO MEAS - MV A MAX VEL: 87.9 CM/SEC
BH CV ECHO MEAS - MV DEC TIME: 0.23 SEC
BH CV ECHO MEAS - MV E MAX VEL: 60.2 CM/SEC
BH CV ECHO MEAS - MV E/A: 0.68
BH CV ECHO MEAS - PA ACC SLOPE: 975 CM/SEC^2
BH CV ECHO MEAS - PA ACC TIME: 0.07 SEC
BH CV ECHO MEAS - PA PR(ACCEL): 45.7 MMHG
BH CV ECHO MEAS - PI END-D VEL: 121 CM/SEC
BH CV ECHO MEAS - RAP SYSTOLE: 3 MMHG
BH CV ECHO MEAS - RVSP: 10 MMHG
BH CV ECHO MEAS - SI(AO): 113 ML/M^2
BH CV ECHO MEAS - SI(CUBED): 24 ML/M^2
BH CV ECHO MEAS - SI(LVOT): 31.1 ML/M^2
BH CV ECHO MEAS - SI(MOD-SP2): 16.5 ML/M^2
BH CV ECHO MEAS - SI(MOD-SP4): 19.6 ML/M^2
BH CV ECHO MEAS - SI(TEICH): 23.3 ML/M^2
BH CV ECHO MEAS - SV(AO): 184.9 ML
BH CV ECHO MEAS - SV(CUBED): 39.2 ML
BH CV ECHO MEAS - SV(LVOT): 50.9 ML
BH CV ECHO MEAS - SV(MOD-SP2): 27 ML
BH CV ECHO MEAS - SV(MOD-SP4): 32 ML
BH CV ECHO MEAS - SV(TEICH): 38.2 ML
BH CV ECHO MEAS - TAPSE (>1.6): 1.7 CM2
BH CV ECHO MEAS - TR MAX PG: 7 MMHG
BH CV ECHO MEAS - TR MAX VEL: 131 CM/SEC
BH CV ECHO MEASUREMENTS AVERAGE E/E' RATIO: 7.72
LV EF 2D ECHO EST: 65 %

## 2019-10-18 ENCOUNTER — TELEPHONE (OUTPATIENT)
Dept: CARDIOLOGY | Facility: CLINIC | Age: 69
End: 2019-10-18

## 2019-10-18 NOTE — TELEPHONE ENCOUNTER
Spoke with patient regarding most recent echo results per .  Pt wants it noted in her chart that she does not want to be on blood pressure medicine at all and has made a few lifestyle changes in hopes of making her bp better. Instructed patient to keep a record of daily blood pressure and heart rate so that we can see how its trending and make  Future medication doses appropriately.

## 2019-11-22 ENCOUNTER — TELEPHONE (OUTPATIENT)
Dept: CARDIOLOGY | Facility: CLINIC | Age: 69
End: 2019-11-22

## 2019-11-22 NOTE — TELEPHONE ENCOUNTER
Patient called to report that she is having trouble with her BP medications, increased HR, and BP fluctuation. Patient states that she would like to schedule appt to see Dr. Austin to discuss these issues. Will have scheduling reach out to schedule follow up.

## 2020-01-23 ENCOUNTER — TELEPHONE (OUTPATIENT)
Dept: OBSTETRICS AND GYNECOLOGY | Facility: CLINIC | Age: 70
End: 2020-01-23

## 2020-01-23 ENCOUNTER — TRANSCRIBE ORDERS (OUTPATIENT)
Dept: ADMINISTRATIVE | Facility: HOSPITAL | Age: 70
End: 2020-01-23

## 2020-01-23 DIAGNOSIS — Z12.31 ENCOUNTER FOR SCREENING MAMMOGRAM FOR MALIGNANT NEOPLASM OF BREAST: Primary | ICD-10-CM

## 2020-01-23 DIAGNOSIS — N95.1 MENOPAUSAL SYMPTOMS: ICD-10-CM

## 2020-01-23 RX ORDER — BUPROPION HYDROCHLORIDE 300 MG/1
300 TABLET ORAL EVERY MORNING
Qty: 90 TABLET | Refills: 3 | Status: SHIPPED | OUTPATIENT
Start: 2020-01-23 | End: 2020-12-23 | Stop reason: SDUPTHER

## 2020-01-23 NOTE — TELEPHONE ENCOUNTER
----- Message from Mica Cotto sent at 1/23/2020 11:29 AM EST -----  Contact: Pt  Pt requested a refill for: Wellbutrin 300 mg.  Pt cas' her annual w/ Dr Dent.    RX: [facility]

## 2020-03-13 ENCOUNTER — APPOINTMENT (OUTPATIENT)
Dept: MAMMOGRAPHY | Facility: HOSPITAL | Age: 70
End: 2020-03-13

## 2020-03-19 ENCOUNTER — APPOINTMENT (OUTPATIENT)
Dept: MAMMOGRAPHY | Facility: HOSPITAL | Age: 70
End: 2020-03-19

## 2020-05-22 ENCOUNTER — APPOINTMENT (OUTPATIENT)
Dept: MAMMOGRAPHY | Facility: HOSPITAL | Age: 70
End: 2020-05-22

## 2020-06-24 ENCOUNTER — HOSPITAL ENCOUNTER (OUTPATIENT)
Dept: MAMMOGRAPHY | Facility: HOSPITAL | Age: 70
Discharge: HOME | End: 2020-06-24
Admitting: OBSTETRICS & GYNECOLOGY

## 2020-06-24 DIAGNOSIS — Z12.31 ENCOUNTER FOR SCREENING MAMMOGRAM FOR MALIGNANT NEOPLASM OF BREAST: ICD-10-CM

## 2020-06-24 PROCEDURE — 77067 SCR MAMMO BI INCL CAD: CPT

## 2020-06-24 PROCEDURE — 77063 BREAST TOMOSYNTHESIS BI: CPT

## 2020-12-21 RX ORDER — ESTRADIOL 0.1 MG/G
2 CREAM VAGINAL DAILY
Qty: 42.5 G | Refills: 11 | Status: CANCELLED | OUTPATIENT
Start: 2020-12-21

## 2020-12-23 ENCOUNTER — OFFICE VISIT (OUTPATIENT)
Dept: OBSTETRICS AND GYNECOLOGY | Facility: CLINIC | Age: 70
End: 2020-12-23

## 2020-12-23 VITALS
BODY MASS INDEX: 22.02 KG/M2 | DIASTOLIC BLOOD PRESSURE: 58 MMHG | WEIGHT: 137 LBS | HEIGHT: 66 IN | SYSTOLIC BLOOD PRESSURE: 114 MMHG

## 2020-12-23 DIAGNOSIS — Z12.31 ENCOUNTER FOR SCREENING MAMMOGRAM FOR BREAST CANCER: ICD-10-CM

## 2020-12-23 DIAGNOSIS — N95.2 POSTMENOPAUSAL ATROPHIC VAGINITIS: ICD-10-CM

## 2020-12-23 DIAGNOSIS — N95.1 MENOPAUSAL SYMPTOMS: ICD-10-CM

## 2020-12-23 DIAGNOSIS — R10.32 LEFT LOWER QUADRANT PAIN: ICD-10-CM

## 2020-12-23 DIAGNOSIS — Z01.419 ENCOUNTER FOR GYNECOLOGICAL EXAMINATION (GENERAL) (ROUTINE) WITHOUT ABNORMAL FINDINGS: Primary | ICD-10-CM

## 2020-12-23 PROCEDURE — 99213 OFFICE O/P EST LOW 20 MIN: CPT | Performed by: OBSTETRICS & GYNECOLOGY

## 2020-12-23 PROCEDURE — G0101 CA SCREEN;PELVIC/BREAST EXAM: HCPCS | Performed by: OBSTETRICS & GYNECOLOGY

## 2020-12-23 RX ORDER — MECLIZINE HCL 12.5 MG/1
TABLET ORAL
COMMUNITY

## 2020-12-23 RX ORDER — BUPROPION HYDROCHLORIDE 300 MG/1
300 TABLET ORAL EVERY MORNING
Qty: 90 TABLET | Refills: 3 | Status: SHIPPED | OUTPATIENT
Start: 2020-12-23 | End: 2022-03-01

## 2020-12-23 RX ORDER — FLUTICASONE PROPIONATE 50 MCG
SPRAY, SUSPENSION (ML) NASAL
COMMUNITY

## 2020-12-23 NOTE — PROGRESS NOTES
Subjective  Chief Complaint   Patient presents with   • Gynecologic Exam     Patient is 70 y.o.  here for her annual examination.  Patient had a Pap smear in January of last year.  She has had a history of a hysterectomy.  Patient still has her left ovary.  Patient desires Pap smear today.  Patient continues to use the estrogen cream for her vaginal atrophy.  Patient has been on Wellbutrin as well.  She has been on Wellbutrin for her menopausal symptoms.  She reports her symptoms are well controlled.  She still has occasional hot flashes and night sweats.  She desires to continue Wellbutrin.  Patient does have complaints of left lower quadrant pain.  She reports the pain has been intermittent in nature over the last month.  She reports having sharp stabbing pain lasting for several minutes.  She denies any fever or chills.  Patient does report having a history of constipation.  She reports worsening of her symptoms over the last year.  Patient reports her stools are hard and bulky in nature.  She denies any rectal bleeding or dark tarry stools.  She reports she has tried numerous medications without any improvement.  She has tried MiraLAX in the past without success.  Patient reports having a colonoscopy 4 years ago.  She does report having diverticulosis.  Patient reports at times she has had flareups with diverticulitis as well.  She has not had to have any hospitalization.  She denies any fever or chills.  She denies any nausea or vomiting.  Patient is concerned as she still has her left ovary.    History  Past Medical History:   Diagnosis Date   • Diabetes (CMS/[facility])    • GERD (gastroesophageal reflux disease)    • Heart disease    • Normal vaginal Papanicolaou smear in patient with history of hysterectomy 10/17/2016   • Osteoarthritis    • Osteoporosis      Current Outpatient Medications on File Prior to Visit   Medication Sig Dispense Refill   • Cholecalciferol (VITAMIN D3) 5000 units tablet tablet Take  "5,000 Units by mouth Daily.     • estradiol (ESTRACE) 0.1 MG/GM vaginal cream Insert 2 g into the vagina Daily.     • fluticasone (FLONASE) 50 MCG/ACT nasal spray fluticasone propionate 50 mcg/actuation nasal spray,suspension     • lisinopril (PRINIVIL,ZESTRIL) 10 MG tablet Take 10 mg by mouth Daily.     • MAGNESIUM PO Take 330 mg by mouth Daily.     • meclizine (ANTIVERT) 12.5 MG tablet meclizine 12.5 mg tablet     • Multiple Vitamins-Minerals (HAIR SKIN AND NAILS FORMULA PO) Take  by mouth Daily.     • Probiotic Product (PROBIOTIC PO) Take  by mouth Daily.     • simvastatin (ZOCOR) 10 MG tablet Take 10 mg by mouth Every Night.       No current facility-administered medications on file prior to visit.      No Known Allergies  Past Surgical History:   Procedure Laterality Date   • HYSTERECTOMY     • NOSE SURGERY     • OOPHORECTOMY       Family History   Problem Relation Age of Onset   • Diabetes Father    • Heart attack Father    • Osteoporosis Mother      Social History     Socioeconomic History   • Marital status:      Spouse name: Not on file   • Number of children: Not on file   • Years of education: Not on file   • Highest education level: Not on file   Tobacco Use   • Smoking status: Never Smoker   • Smokeless tobacco: Never Used   Substance and Sexual Activity   • Alcohol use: Yes     Comment: rarely   • Drug use: No   • Sexual activity: Defer       Review of Systems  The following systems were reviewed and negative:  constitution, eyes, ENT, respiratory, cardiovascular, gastrointestinal, genitourinary, integument, breast, hematologic / lymphatic, musculoskeletal, neurological, behavioral/psych, endocrine and allergies / immunologic  Objective  Vitals:    12/23/20 1448   BP: 114/58   Weight: 62.1 kg (137 lb)   Height: 167.6 cm (66\")     Physical Exam:  General Appearance: alert, appears stated age and cooperative  Head: normocephalic, without obvious abnormality and atraumatic  Eyes: lids and lashes " normal, conjunctivae and sclerae normal, no icterus, no pallor, corneas clear and PERRLA  Ears: ears appear intact with no abnormalities noted  Nose: nares normal, septum midline, mucosa normal and no drainage  Neck: suppple, trachea midline and no thyromegaly  Lungs: clear to auscultation, respirations regular, respirations even and respirations unlabored  Heart: regular rhythm and normal rate, normal S1, S2, no murmur, gallop, or rubs and no click  Breasts: Examined in supine position  Symmetric without masses or skin dimpling  Nipples normal without inversion, lesions or discharge  There are no palpable axillary nodes  Abdomen: normal bowel sounds, no masses, no hepatomegaly, no splenomegaly, soft non-tender, no guarding and no rebound tenderness  Pelvic: Clinical staff was present for exam  External genitalia:  normal appearance of the external genitalia including Bartholin's and Rosenberg's glands.  :  urethral meatus normal;  Vaginal:  atrophic mucosal changes are present;  Cervix:  absent.  Uterus:  absent.  Adnexa:  non palpable bilaterally.  Pap smear done and specimen sent using Thin-Prep technique  Extremities: moves extremities well, no edema, no cyanosis and no redness  Skin: no bleeding, bruising or rash and no lesions noted  Lymph Nodes: no palpable adenopathy  Neuro: CN II-X grossly intact; sensation intact  Psych: normal mood and affect, oriented to person, time and place, thought content organized and appropriate judgment  Lab Review   No data reviewed    Imaging   Mammogram report    Decision to Obtain Medical Records  No    Summary of Medical Records  No    Assessment/Plan  1. Encounter for gynecological examination (general) (routine) without abnormal findings  I explained to Madelaine that Pap smears are no longer recommended in patients after 65 years of age who have had adequate negative screening with three consecutive negative pap smears within the previous 10 years and the most recent test  performed within the past 5 years. Women who have a history of IZABELLA 2, IZABELLA 3, or ACIS should continue routine screening for a total of 20 years after regression or treatment.   I stressed to Madelaine that she still should be seen yearly for a full physical including breast and pelvic exam.  I informed her that women aged 65 and older still do get cervical cancer representing 14.1% of the [place] female population and 19.6% of new cases of cervical cancer.  I also informed the patient regarding medicare guidelines on coverage for pap smear screening.  For this reason, Madelaine has elected pap smear screening this year.    2. Encounter for screening mammogram for breast cancer  It is recommended per ACOG, for women at average risk to start annual mammogram screening at the age of 40 until the age of 75 and an individualized decision be made for women after age 75.  She was encouraged to continue getting yearly mammograms.  She should report any palpable breast lump(s) or skin changes regardless of mammographic findings.  I explained to Madelaine that notification regarding her mammogram results will come from the center performing the study.  Our office will not be routinely calling with mammogram results.  It is her responsibility to make sure that the results from the mammogram are communicated to her by the breast center.  If she has any questions about the results, she is welcome to call our office anytime.  The patient reports she has done her mammogram and results are noted.    Madelaine was counseled regarding having clinical breast exams and breast self-awareness.  Women aged 29-39 years of age should have clinical breast exams every 1-3 years and yearly aged 40 and older.  The patient was counseled regarding breast self-awareness focusing on having a sense of what is normal for her breasts so that she can tell if there are changes.  Even small changes should be reported to provider.    3. Menopausal  symptoms unchanged  Prescription is given for Wellbutrin today.  Instructions and precautions are given.  Patient is to call if worsening and/or no improvement in her symptoms.  - estradiol (ESTRACE) 0.1 MG/GM vaginal cream; Insert 2 g into the vagina Daily.  Dispense: 42.5 g; Refill: 11     4.  Postmenopausal atrophy   Unchanged  Patient with continued atrophic changes.  Patient is instructed to continue her estrogen cream as previously given.  Prescription is given as noted.  Instructions and precautions have been given.    5.  Left lower quadrant pain New  Patient with left lower quadrant pain as noted.  There are no abnormalities noted on examination today.  Instructions and precautions are given.  Patient is to call if worsening of her symptoms and will schedule transvaginal ultrasound for further evaluation.  The patient is also instructed this could be a flareup of her diverticulitis.  Instructions and precautions are given.  If worsening of her symptoms and/or changes as discussed then patient may need CT scan as well for further evaluation.    6.  Constipation New  Discussed various treatment options for constipation.  Pt informed in use of stool softners.  Pt also informed for need of plenty of water with 64 oz of water recommended daily.  Pt also informed regarding trial with use of Miralax 1 cap full (17 grams) mixed with 1 tablespoon of Citracel daily in 8 oz of water.  Pt to follow this with another 8 oz of water.  Instructions and precautions given.  Pt to call if no improvement in symptoms in 2-3 weeks.  Patient is also instructed if this does not work to consider prune juice on a daily or every other day basis.  Instructions and precautions are given.  Patient is to call if no improvement and/or worsening of her symptoms.      Follow up as discussed/scheduled  Note: Speech recognition transcription software may have been used to dictate portions of this document.  An attempt at proofreading has been  made though minor errors in transcription may still be present.  This note was electronically signed.  Lu Dent M.D.

## 2021-03-10 ENCOUNTER — OFFICE VISIT (OUTPATIENT)
Dept: CARDIOLOGY | Facility: CLINIC | Age: 71
End: 2021-03-10

## 2021-03-10 VITALS
DIASTOLIC BLOOD PRESSURE: 60 MMHG | SYSTOLIC BLOOD PRESSURE: 112 MMHG | BODY MASS INDEX: 23.92 KG/M2 | HEART RATE: 89 BPM | HEIGHT: 63 IN | WEIGHT: 135 LBS

## 2021-03-10 DIAGNOSIS — I44.7 LBBB (LEFT BUNDLE BRANCH BLOCK): ICD-10-CM

## 2021-03-10 DIAGNOSIS — I20.9 ANGINA PECTORIS (HCC): Primary | ICD-10-CM

## 2021-03-10 DIAGNOSIS — E78.2 MIXED HYPERLIPIDEMIA: ICD-10-CM

## 2021-03-10 DIAGNOSIS — I24.0 ACUTE CORONARY THROMBOSIS NOT RESULTING IN MYOCARDIAL INFARCTION (HCC): ICD-10-CM

## 2021-03-10 DIAGNOSIS — I10 ESSENTIAL HYPERTENSION: ICD-10-CM

## 2021-03-10 PROCEDURE — 99214 OFFICE O/P EST MOD 30 MIN: CPT | Performed by: INTERNAL MEDICINE

## 2021-03-10 NOTE — PROGRESS NOTES
[facility] at [facility]  Consultation H&P  Madelaine Brown  1950  [address] [place] [id number]     VISIT DATE:  03/10/21    PCP: Evaristo Danielle MD  [address] [place] [id number]    IDENTIFICATION: A 71 y.o. 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 HTN  2. LBBB 2021   3. HLD, on simvastatin HDL 72, LDL 92  4. Vitamin D deficiency  5. Osteoporosis  6. OA  7. GERD  8. Depression  9. Surgical history:  1. Oophorectomy  2. Hysterectomy  3. No surgery    Allergies  No Known Allergies    Current Medications    Current Outpatient Medications:   •  buPROPion XL (WELLBUTRIN XL) 300 MG 24 hr tablet, Take 1 tablet by mouth Every Morning., Disp: 90 tablet, Rfl: 3  •  Cholecalciferol (VITAMIN D3) 5000 units tablet tablet, Take 5,000 Units by mouth Daily., Disp: , Rfl:   •  estradiol (ESTRACE) 0.1 MG/GM vaginal cream, Insert 2 g into the vagina Daily., Disp: , Rfl:   •  fluticasone (FLONASE) 50 MCG/ACT nasal spray, fluticasone propionate 50 mcg/actuation nasal spray,suspension, Disp: , Rfl:   •  lisinopril (PRINIVIL,ZESTRIL) 10 MG tablet, Take 10 mg by mouth Daily., Disp: , Rfl:   •  MAGNESIUM PO, Take 330 mg by mouth Daily., Disp: , Rfl:   •  Probiotic Product (PROBIOTIC PO), Take  by mouth Daily., Disp: , Rfl:   •  simvastatin (ZOCOR) 10 MG tablet, Take 10 mg by mouth Every Night., Disp: , Rfl:   •  meclizine (ANTIVERT) 12.5 MG tablet, meclizine 12.5 mg tablet, Disp: , Rfl:   •  Multiple Vitamins-Minerals (HAIR SKIN AND NAILS FORMULA PO), Take  by mouth Daily., Disp: , Rfl:      History of Present Illness   HPI  Madelaine Brown is a 71 y.o. year old female with the above mentioned PMH who presents for consult from PCP Dr. Danielle for evaluation of HTN.  Patient has been having occasional nighttime right-sided chest discomfort.  She presented to the emergency department after talking with her primary  "care provider and having a new left bundle branch block.  She was released after 2 normal troponins with close follow-up.  She states that she is largely been inactive through the winter and Covid and is interested in traveling this spring to [place].  She is been compliant with her medications.  She cannot state that there is any postprandial presentation to such and is not related with shortness of breath and/or palpitations        Vitals:    03/10/21 1346   BP: 112/60   BP Location: Right arm   Patient Position: Sitting   Pulse: 89   Weight: 61.2 kg (135 lb)   Height: 160 cm (63\")       PHYSICAL EXAMINATION:  Physical Exam  Vitals and nursing note reviewed.   Constitutional:       General: She is not in acute distress.     Appearance: She is well-developed.   HENT:      Head: Normocephalic and atraumatic.      Nose: Nose normal.      Mouth/Throat:      Pharynx: Uvula midline.   Eyes:      General: No scleral icterus.     Conjunctiva/sclera: Conjunctivae normal.      Pupils: Pupils are equal, round, and reactive to light.   Neck:      Thyroid: No thyromegaly.      Vascular: No carotid bruit, hepatojugular reflux or JVD.      Trachea: No tracheal deviation.   Cardiovascular:      Rate and Rhythm: Normal rate and regular rhythm.      Chest Wall: PMI is not displaced.      Pulses: Normal pulses and intact distal pulses. No midsystolic click and no opening snap.           Radial pulses are 2+ on the right side and 2+ on the left side.        Dorsalis pedis pulses are 2+ on the right side and 2+ on the left side.        Posterior tibial pulses are 2+ on the right side and 2+ on the left side.      Heart sounds: S1 normal and S2 normal. Heart sounds not distant. No murmur. No friction rub. No gallop.    Pulmonary:      Effort: Pulmonary effort is normal.      Breath sounds: Normal breath sounds. No wheezing, rhonchi or rales.   Abdominal:      General: Bowel sounds are normal.      Palpations: Abdomen is soft. There is " no mass.      Tenderness: There is no abdominal tenderness. There is no guarding.   Musculoskeletal:         General: No tenderness.      Cervical back: Normal range of motion and neck supple.   Lymphadenopathy:      Cervical: No cervical adenopathy.   Skin:     General: Skin is warm and dry.      Findings: No erythema or rash.      Nails: There is no clubbing.   Neurological:      Mental Status: She is alert and oriented to person, place, and time.   Psychiatric:         Behavior: Behavior normal.         Diagnostic Data:  Procedures  No results found for: CHLPL, TRIG, HDL, LDLDIRECT  Lab Results   Component Value Date    BUN 13 08/30/2018    CREATININE 0.70 08/30/2018     08/30/2018    K 4.7 08/30/2018     08/30/2018    CO2 27.0 08/30/2018     Lab Results   Component Value Date    HGBA1C 5.50 08/30/2018     Lab Results   Component Value Date    WBC 5.69 08/30/2018    HGB 12.9 08/30/2018    HCT 38.6 08/30/2018     08/30/2018       ASSESSMENT:   Diagnosis Plan   1. Angina pectoris (CMS/HCC)     2. LBBB (left bundle branch block)     3. Essential hypertension     4. Mixed hyperlipidemia           PLAN:  Chest pain with left bundle branch block hypertensive dyslipidemic risk ratification with Lexiscan Cardiolite    Dyslipidemia controlled on statin therapy    Hypertension controlled on low-dose lisinopril    Recommendation preventive care 150 minutes of aerobic exercise weekly and reiterated the impact of lowered dementia risk with such as well    Antione Austin MD, [facility]

## 2021-03-22 ENCOUNTER — HOSPITAL ENCOUNTER (OUTPATIENT)
Dept: CARDIOLOGY | Facility: HOSPITAL | Age: 71
Discharge: HOME | End: 2021-03-22

## 2021-03-22 VITALS — HEIGHT: 63 IN | BODY MASS INDEX: 23.74 KG/M2 | WEIGHT: 134 LBS

## 2021-03-22 DIAGNOSIS — I24.0 ACUTE CORONARY THROMBOSIS NOT RESULTING IN MYOCARDIAL INFARCTION (HCC): ICD-10-CM

## 2021-03-22 DIAGNOSIS — I44.7 LBBB (LEFT BUNDLE BRANCH BLOCK): ICD-10-CM

## 2021-03-22 LAB
BH CV REST NUCLEAR ISOTOPE DOSE: 9.2 MCI
BH CV STRESS BP STAGE 2: NORMAL
BH CV STRESS BP STAGE 4: NORMAL
BH CV STRESS COMMENTS STAGE 1: NORMAL
BH CV STRESS DOSE REGADENOSON STAGE 1: 0.4
BH CV STRESS DURATION MIN STAGE 1: 1
BH CV STRESS DURATION MIN STAGE 2: 1
BH CV STRESS DURATION MIN STAGE 3: 1
BH CV STRESS DURATION MIN STAGE 4: 1
BH CV STRESS DURATION SEC STAGE 1: 0
BH CV STRESS DURATION SEC STAGE 2: 0
BH CV STRESS DURATION SEC STAGE 3: 0
BH CV STRESS DURATION SEC STAGE 4: 0
BH CV STRESS HR STAGE 1: 63
BH CV STRESS HR STAGE 2: 99
BH CV STRESS HR STAGE 3: 97
BH CV STRESS HR STAGE 4: 93
BH CV STRESS NUCLEAR ISOTOPE DOSE: 31.5 MCI
BH CV STRESS PROTOCOL 1: NORMAL
BH CV STRESS RECOVERY BP: NORMAL MMHG
BH CV STRESS RECOVERY HR: 85 BPM
BH CV STRESS STAGE 1: 1
BH CV STRESS STAGE 2: 2
BH CV STRESS STAGE 3: 3
BH CV STRESS STAGE 4: 4
LV EF NUC BP: 71 %
MAXIMAL PREDICTED HEART RATE: 150 BPM
PERCENT MAX PREDICTED HR: 68.67 %
STRESS BASELINE BP: NORMAL MMHG
STRESS BASELINE HR: 63 BPM
STRESS O2 SAT REST: 99 %
STRESS PERCENT HR: 81 %
STRESS POST PEAK BP: NORMAL MMHG
STRESS POST PEAK HR: 103 BPM
STRESS TARGET HR: 128 BPM

## 2021-03-22 PROCEDURE — 93017 CV STRESS TEST TRACING ONLY: CPT

## 2021-03-22 PROCEDURE — 93018 CV STRESS TEST I&R ONLY: CPT | Performed by: INTERNAL MEDICINE

## 2021-03-22 PROCEDURE — 78452 HT MUSCLE IMAGE SPECT MULT: CPT

## 2021-03-22 PROCEDURE — 0 TECHNETIUM SESTAMIBI: Performed by: INTERNAL MEDICINE

## 2021-03-22 PROCEDURE — 78452 HT MUSCLE IMAGE SPECT MULT: CPT | Performed by: INTERNAL MEDICINE

## 2021-03-22 PROCEDURE — A9500 TC99M SESTAMIBI: HCPCS | Performed by: INTERNAL MEDICINE

## 2021-03-22 PROCEDURE — 25010000002 REGADENOSON 0.4 MG/5ML SOLUTION: Performed by: INTERNAL MEDICINE

## 2021-03-22 RX ADMIN — REGADENOSON 0.4 MG: 0.08 INJECTION, SOLUTION INTRAVENOUS at 10:47

## 2021-03-22 RX ADMIN — TECHNETIUM TC 99M SESTAMIBI 1 DOSE: 1 INJECTION INTRAVENOUS at 10:59

## 2021-03-22 RX ADMIN — TECHNETIUM TC 99M SESTAMIBI 1 DOSE: 1 INJECTION INTRAVENOUS at 08:05

## 2021-03-23 ENCOUNTER — TELEPHONE (OUTPATIENT)
Dept: CARDIOLOGY | Facility: CLINIC | Age: 71
End: 2021-03-23

## 2021-03-23 NOTE — TELEPHONE ENCOUNTER
Spoke with patient and advised that most recent stress test per  is low risk and WNL. Pt verbalized understanding

## 2021-06-07 ENCOUNTER — TELEPHONE (OUTPATIENT)
Dept: OBSTETRICS AND GYNECOLOGY | Facility: CLINIC | Age: 71
End: 2021-06-07

## 2021-06-07 RX ORDER — FLUCONAZOLE 150 MG/1
TABLET ORAL
Qty: 2 TABLET | Refills: 0 | Status: SHIPPED | OUTPATIENT
Start: 2021-06-07

## 2021-06-07 NOTE — TELEPHONE ENCOUNTER
----- Message from Kerrie Ga sent at 6/7/2021  9:10 AM EDT -----  Regarding: RX REQUEST  Contact: PT  THIS IS DR CHEATHAM'S PT.  SHE IS MISERABLE WITH A YEAST INFECTION.  CAN WE SEND DIFLUCAN TO [facility] IN [place]?  THANKS

## 2021-06-11 ENCOUNTER — OFFICE VISIT (OUTPATIENT)
Dept: OBSTETRICS AND GYNECOLOGY | Facility: CLINIC | Age: 71
End: 2021-06-11

## 2021-06-11 VITALS
HEIGHT: 63 IN | WEIGHT: 129 LBS | DIASTOLIC BLOOD PRESSURE: 56 MMHG | BODY MASS INDEX: 22.86 KG/M2 | SYSTOLIC BLOOD PRESSURE: 116 MMHG

## 2021-06-11 DIAGNOSIS — N76.0 ACUTE VAGINITIS: ICD-10-CM

## 2021-06-11 DIAGNOSIS — R39.89 POSSIBLE URINARY TRACT INFECTION: ICD-10-CM

## 2021-06-11 DIAGNOSIS — N76.0 ACUTE VAGINITIS: Primary | ICD-10-CM

## 2021-06-11 DIAGNOSIS — N89.8 VAGINAL ITCHING: Primary | ICD-10-CM

## 2021-06-11 PROCEDURE — 99213 OFFICE O/P EST LOW 20 MIN: CPT | Performed by: PHYSICIAN ASSISTANT

## 2021-06-11 RX ORDER — FLUCONAZOLE 150 MG/1
TABLET ORAL
Qty: 2 TABLET | Refills: 0 | Status: SHIPPED | OUTPATIENT
Start: 2021-06-11

## 2021-06-11 RX ORDER — IVERMECTIN 3 MG/1
TABLET ORAL
COMMUNITY
Start: 2021-05-06

## 2021-06-11 RX ORDER — DICLOFENAC SODIUM 75 MG/1
75 TABLET, DELAYED RELEASE ORAL 2 TIMES DAILY
COMMUNITY
Start: 2021-05-06

## 2021-06-11 RX ORDER — CLOTRIMAZOLE AND BETAMETHASONE DIPROPIONATE 10; .64 MG/G; MG/G
CREAM TOPICAL
Qty: 15 G | Refills: 0 | Status: SHIPPED | OUTPATIENT
Start: 2021-06-11

## 2021-06-11 RX ORDER — NEOMYCIN SULFATE, POLYMYXIN B SULFATE AND DEXAMETHASONE 3.5; 10000; 1 MG/ML; [USP'U]/ML; MG/ML
SUSPENSION/ DROPS OPHTHALMIC
COMMUNITY
Start: 2021-04-06

## 2021-06-11 NOTE — PATIENT INSTRUCTIONS
Symptoms and exam seem consistent with yeast vaginitis.  We will repeat Diflucan for 2 doses.  Also will begin Lotrisone thin layer twice daily for 5 to 7 days and she is advised to use this externally only.  Yeast swab is sent and patient will be contacted with results when available.

## 2021-06-11 NOTE — PROGRESS NOTES
Subjective   Chief Complaint   Patient presents with   • Vaginal Itching     Patient complains of vaginal itching and irritation since Monday, patient advised after using Diflucan and Monistat OTC symptoms have worsened.        Madelaine Brown is a 71 y.o. year old  presenting to be seen for vulvar itching.  Patient reports she started having vulvar and vaginal itching last Friday.  She had call the office for prescription for suspected yeast infection and was given Diflucan which she took Monday of this week and then a second dose yesterday.  Reports that Tuesday of this week she used some Monistat cream over-the-counter which caused her to start having vaginal burning and irritation.  She is still having significant itching of the labia and vaginal opening.  She has not had any discharge.  She has not had any vaginal bleeding.  She does have estrogen cream at home which she uses on occasion but not routinely.    Past Medical History:   Diagnosis Date   • Diabetes (CMS/[facility])    • GERD (gastroesophageal reflux disease)    • Heart disease    • Normal vaginal Papanicolaou smear in patient with history of hysterectomy 10/17/2016   • Osteoarthritis    • Osteoporosis         Current Outpatient Medications:   •  buPROPion XL (WELLBUTRIN XL) 300 MG 24 hr tablet, Take 1 tablet by mouth Every Morning., Disp: 90 tablet, Rfl: 3  •  Cholecalciferol (VITAMIN D3) 5000 units tablet tablet, Take 5,000 Units by mouth Daily., Disp: , Rfl:   •  clotrimazole-betamethasone (Lotrisone) 1-0.05 % cream, Apply thin layer to affected area bid for 5-7 days, Disp: 15 g, Rfl: 0  •  diclofenac (VOLTAREN) 75 MG EC tablet, Take 75 mg by mouth 2 (Two) Times a Day., Disp: , Rfl:   •  estradiol (ESTRACE) 0.1 MG/GM vaginal cream, Insert 2 g into the vagina Daily., Disp: , Rfl:   •  fluconazole (Diflucan) 150 MG tablet, 1 po now and repeat in 3 d., Disp: 2 tablet, Rfl: 0  •  fluconazole (Diflucan) 150 MG tablet, One po now and repeat in 3 days,  Disp: 2 tablet, Rfl: 0  •  fluticasone (FLONASE) 50 MCG/ACT nasal spray, fluticasone propionate 50 mcg/actuation nasal spray,suspension, Disp: , Rfl:   •  ivermectin (STROMECTOL) 3 MG tablet tablet, TAKE 4 AND 1/2 TABLETS BY MOUTH FOR 1 DAY. REPEAT SAME DOSE IN 48 HOURS. THEN TAKE 4.5 TABLET 1 TIME WEEKLY, Disp: , Rfl:   •  lisinopril (PRINIVIL,ZESTRIL) 10 MG tablet, Take 10 mg by mouth Daily., Disp: , Rfl:   •  MAGNESIUM PO, Take 330 mg by mouth Daily., Disp: , Rfl:   •  meclizine (ANTIVERT) 12.5 MG tablet, meclizine 12.5 mg tablet, Disp: , Rfl:   •  Multiple Vitamins-Minerals (HAIR SKIN AND NAILS FORMULA PO), Take  by mouth Daily., Disp: , Rfl:   •  mupirocin (BACTROBAN) 2 % ointment, mupirocin 2 % topical ointment, Disp: , Rfl:   •  neomycin-polymyxin-dexamethasone (MAXITROL) 3.5-77886-4.1 ophthalmic suspension, , Disp: , Rfl:   •  Probiotic Product (PROBIOTIC PO), Take  by mouth Daily., Disp: , Rfl:   •  simvastatin (ZOCOR) 10 MG tablet, Take 10 mg by mouth Every Night., Disp: , Rfl:    No Known Allergies   Past Surgical History:   Procedure Laterality Date   • HYSTERECTOMY     • NOSE SURGERY     • OOPHORECTOMY        Social History     Socioeconomic History   • Marital status:      Spouse name: Not on file   • Number of children: Not on file   • Years of education: Not on file   • Highest education level: Not on file   Tobacco Use   • Smoking status: Never Smoker   • Smokeless tobacco: Never Used   Substance and Sexual Activity   • Alcohol use: Yes     Comment: rarely   • Drug use: No   • Sexual activity: Defer      Family History   Problem Relation Age of Onset   • Diabetes Father    • Heart attack Father    • Osteoporosis Mother        Review of Systems   Constitutional: Negative for chills, diaphoresis and fever.   Gastrointestinal: Negative for abdominal pain, constipation, diarrhea, nausea and vomiting.   Genitourinary: Positive for vaginal pain. Negative for difficulty urinating, pelvic pain,  "vaginal bleeding and vaginal discharge.           Objective   /56   Ht 160 cm (63\")   Wt 58.5 kg (129 lb)   Breastfeeding No   BMI 22.85 kg/m²     Physical Exam  Constitutional:       Appearance: Normal appearance. She is well-developed and well-groomed.   Eyes:      General: Lids are normal.      Extraocular Movements: Extraocular movements intact.      Conjunctiva/sclera: Conjunctivae normal.   Genitourinary:     Labia:         Right: No rash, tenderness or lesion.         Left: No rash, tenderness or lesion.       Urethra: No prolapse, urethral pain, urethral swelling or urethral lesion.      Comments: Introitus and proximal vaginal walls are red.  There is a small amount of thick white exudate noted.  There are no lesions noted.  There is no signs of lichen sclerosis.  Skin:     General: Skin is warm and dry.      Findings: No bruising or lesion.   Neurological:      Mental Status: She is alert.   Psychiatric:         Attention and Perception: Attention normal.         Mood and Affect: Mood normal.         Speech: Speech normal.         Behavior: Behavior is cooperative.            Result Review :                   Assessment and Plan  Diagnoses and all orders for this visit:    1. Vaginal itching (Primary)  -     Candida panel, PCR - Swab, Vagina    Other orders  -     fluconazole (Diflucan) 150 MG tablet; One po now and repeat in 3 days  Dispense: 2 tablet; Refill: 0  -     clotrimazole-betamethasone (Lotrisone) 1-0.05 % cream; Apply thin layer to affected area bid for 5-7 days  Dispense: 15 g; Refill: 0      Patient Instructions   Symptoms and exam seem consistent with yeast vaginitis.  We will repeat Diflucan for 2 doses.  Also will begin Lotrisone thin layer twice daily for 5 to 7 days and she is advised to use this externally only.  Yeast swab is sent and patient will be contacted with results when available.             This note was electronically signed.    Bren Nguyen PA-C   June 11, " 2021

## 2021-06-14 ENCOUNTER — TELEPHONE (OUTPATIENT)
Dept: OBSTETRICS AND GYNECOLOGY | Facility: CLINIC | Age: 71
End: 2021-06-14

## 2021-06-14 LAB
APPEARANCE UR: CLEAR
BACTERIA #/AREA URNS HPF: ABNORMAL /HPF
BACTERIA UR CULT: ABNORMAL
BACTERIA UR CULT: ABNORMAL
BILIRUB UR QL STRIP: NEGATIVE
CASTS URNS MICRO: ABNORMAL
COLOR UR: YELLOW
EPI CELLS #/AREA URNS HPF: ABNORMAL /HPF
GLUCOSE UR QL: NEGATIVE
HGB UR QL STRIP: ABNORMAL
KETONES UR QL STRIP: NEGATIVE
LEUKOCYTE ESTERASE UR QL STRIP: ABNORMAL
NITRITE UR QL STRIP: POSITIVE
OTHER ANTIBIOTIC SUSC ISLT: ABNORMAL
PH UR STRIP: 6 [PH] (ref 5–8)
PROT UR QL STRIP: NEGATIVE
RBC #/AREA URNS HPF: ABNORMAL /HPF
SP GR UR: 1.01 (ref 1–1.03)
UROBILINOGEN UR STRIP-MCNC: ABNORMAL MG/DL
WBC #/AREA URNS HPF: ABNORMAL /HPF

## 2021-06-14 NOTE — TELEPHONE ENCOUNTER
----- Message from Yoselyn Jacob sent at 6/14/2021  9:29 AM EDT -----  Regarding: RESULTS  Patient saw Rosa on Friday, 6/11/21 and is requesting a return call with lab results. Patient states that she is slightly better, but doesn't feel like that she is improving the way that she should be.    Patient call back: [phone]

## 2021-06-15 LAB
A VAGINAE DNA VAG QL NAA+PROBE: NORMAL SCORE
BVAB2 DNA VAG QL NAA+PROBE: NORMAL SCORE
C ALBICANS DNA VAG QL NAA+PROBE: NEGATIVE
C GLABRATA DNA VAG QL NAA+PROBE: NEGATIVE
C TRACH DNA VAG QL NAA+PROBE: NEGATIVE
MEGA1 DNA VAG QL NAA+PROBE: NORMAL SCORE
N GONORRHOEA DNA VAG QL NAA+PROBE: NEGATIVE
T VAGINALIS DNA VAG QL NAA+PROBE: NEGATIVE

## 2021-06-15 RX ORDER — SULFAMETHOXAZOLE AND TRIMETHOPRIM 800; 160 MG/1; MG/1
1 TABLET ORAL 2 TIMES DAILY
Qty: 10 TABLET | Refills: 0 | Status: SHIPPED | OUTPATIENT
Start: 2021-06-15 | End: 2021-06-20

## 2021-06-16 ENCOUNTER — TELEPHONE (OUTPATIENT)
Dept: OBSTETRICS AND GYNECOLOGY | Facility: CLINIC | Age: 71
End: 2021-06-16

## 2021-06-16 DIAGNOSIS — N95.1 MENOPAUSAL SYMPTOMS: ICD-10-CM

## 2021-06-16 RX ORDER — ESTRADIOL 0.1 MG/G
CREAM VAGINAL
Qty: 42.5 G | Refills: 2 | Status: SHIPPED | OUTPATIENT
Start: 2021-06-16

## 2021-06-16 NOTE — TELEPHONE ENCOUNTER
Spoke with patient, she states she completed the diflucan today, symptoms of yeast and itching are still present. RX for UTI was called in yesterday, states that interacts with her blood pressure medication and is afraid to take that. She also would like a new RX for estrace cream.    Thank You

## 2021-06-16 NOTE — TELEPHONE ENCOUNTER
Please inform patient I have sent in refills for estradiol cream. I would encourage her to take the antibiotic as she has urinary tract infection noted with urine culture. Her recent vaginal swab was negative for yeast so I do not think she needs additional yeast medication. Thanks

## 2021-06-28 ENCOUNTER — HOSPITAL ENCOUNTER (OUTPATIENT)
Dept: MAMMOGRAPHY | Facility: HOSPITAL | Age: 71
Discharge: HOME | End: 2021-06-28
Admitting: OBSTETRICS & GYNECOLOGY

## 2021-06-28 DIAGNOSIS — Z12.31 ENCOUNTER FOR SCREENING MAMMOGRAM FOR BREAST CANCER: ICD-10-CM

## 2021-06-28 PROCEDURE — 77067 SCR MAMMO BI INCL CAD: CPT

## 2021-06-28 PROCEDURE — 77063 BREAST TOMOSYNTHESIS BI: CPT

## 2022-02-28 DIAGNOSIS — N95.1 MENOPAUSAL SYMPTOMS: ICD-10-CM

## 2022-03-01 RX ORDER — BUPROPION HYDROCHLORIDE 300 MG/1
300 TABLET ORAL EVERY MORNING
Qty: 90 TABLET | Refills: 3 | Status: SHIPPED | OUTPATIENT
Start: 2022-03-01 | End: 2022-12-02

## 2022-09-02 ENCOUNTER — TRANSCRIBE ORDERS (OUTPATIENT)
Dept: ADMINISTRATIVE | Facility: HOSPITAL | Age: 72
End: 2022-09-02

## 2022-09-02 DIAGNOSIS — Z12.31 VISIT FOR SCREENING MAMMOGRAM: Primary | ICD-10-CM

## 2022-11-17 ENCOUNTER — APPOINTMENT (OUTPATIENT)
Dept: MAMMOGRAPHY | Facility: HOSPITAL | Age: 72
End: 2022-11-17

## 2022-12-02 DIAGNOSIS — N95.1 MENOPAUSAL SYMPTOMS: ICD-10-CM

## 2022-12-02 RX ORDER — BUPROPION HYDROCHLORIDE 300 MG/1
300 TABLET ORAL EVERY MORNING
Qty: 90 TABLET | Refills: 3 | Status: SHIPPED | OUTPATIENT
Start: 2022-12-02

## 2023-04-20 ENCOUNTER — HOSPITAL ENCOUNTER (OUTPATIENT)
Dept: MAMMOGRAPHY | Facility: HOSPITAL | Age: 73
Discharge: HOME | End: 2023-04-20
Admitting: OBSTETRICS & GYNECOLOGY
Payer: MEDICARE

## 2023-04-20 DIAGNOSIS — Z12.31 VISIT FOR SCREENING MAMMOGRAM: ICD-10-CM

## 2023-04-20 PROCEDURE — 77063 BREAST TOMOSYNTHESIS BI: CPT

## 2023-04-20 PROCEDURE — 77067 SCR MAMMO BI INCL CAD: CPT

## 2024-01-06 DIAGNOSIS — N95.1 MENOPAUSAL SYMPTOMS: ICD-10-CM

## 2024-01-08 RX ORDER — BUPROPION HYDROCHLORIDE 300 MG/1
300 TABLET ORAL EVERY MORNING
Qty: 90 TABLET | Refills: 3 | OUTPATIENT
Start: 2024-01-08

## 2024-03-08 ENCOUNTER — TELEPHONE (OUTPATIENT)
Dept: OBSTETRICS AND GYNECOLOGY | Facility: CLINIC | Age: 74
End: 2024-03-08
Payer: MEDICARE

## 2024-03-08 DIAGNOSIS — N95.1 MENOPAUSAL SYMPTOMS: ICD-10-CM

## 2024-03-08 RX ORDER — BUPROPION HYDROCHLORIDE 300 MG/1
300 TABLET ORAL EVERY MORNING
Qty: 90 TABLET | Refills: 0 | Status: SHIPPED | OUTPATIENT
Start: 2024-03-08

## 2024-03-08 NOTE — TELEPHONE ENCOUNTER
Caller: Madelaine Brown LYNDA    Relationship: Self    Best call back number: [phone]    Requested Prescriptions: buPROPion XL (WELLBUTRIN XL) 300 MG 24 hr tablet   Requested Prescriptions      No prescriptions requested or ordered in this encounter        Pharmacy where request should be sent:  [facility]- [address] [place], [place]    Last office visit with prescribing clinician: Visit date not found   Last telemedicine visit with prescribing clinician: Visit date not found   Next office visit with prescribing clinician: Visit date not found     Additional details provided by patient: PT IS ASKING FOR REFILL. WAS RESCHEDULED FOR ANNUAL ON 3/7/24 DUE TO PROVIDER. PT HAS 5 DAY SUPPLY LEFT. PT OF DR. CHEATHAM. RESCHEDULED W/ SONU DEWITT ON 4/9/24    Does the patient have less than a 3 day supply:  [] Yes  [x] No    Would you like a call back once the refill request has been completed: [x] Yes [] No    If the office needs to give you a call back, can they leave a voicemail: [x] Yes [] No    Luiz Navarro Rep   03/08/24 12:20 EST

## 2024-03-11 ENCOUNTER — TRANSCRIBE ORDERS (OUTPATIENT)
Dept: OBSTETRICS AND GYNECOLOGY | Facility: CLINIC | Age: 74
End: 2024-03-11
Payer: MEDICARE

## 2024-03-11 DIAGNOSIS — Z12.31 ENCOUNTER FOR SCREENING MAMMOGRAM FOR BREAST CANCER: Primary | ICD-10-CM

## 2024-04-09 ENCOUNTER — OFFICE VISIT (OUTPATIENT)
Dept: OBSTETRICS AND GYNECOLOGY | Facility: CLINIC | Age: 74
End: 2024-04-09
Payer: MEDICARE

## 2024-04-09 VITALS
DIASTOLIC BLOOD PRESSURE: 68 MMHG | HEIGHT: 63 IN | WEIGHT: 121 LBS | BODY MASS INDEX: 21.44 KG/M2 | SYSTOLIC BLOOD PRESSURE: 110 MMHG

## 2024-04-09 DIAGNOSIS — Z01.419 ROUTINE GYNECOLOGICAL EXAMINATION: Primary | ICD-10-CM

## 2024-04-09 DIAGNOSIS — F41.9 ANXIETY: ICD-10-CM

## 2024-04-09 DIAGNOSIS — Z76.0 MEDICATION REFILL: ICD-10-CM

## 2024-04-09 DIAGNOSIS — N95.2 VAGINAL ATROPHY: ICD-10-CM

## 2024-04-09 DIAGNOSIS — N95.1 MENOPAUSAL SYMPTOMS: ICD-10-CM

## 2024-04-09 PROCEDURE — 99397 PER PM REEVAL EST PAT 65+ YR: CPT | Performed by: PHYSICIAN ASSISTANT

## 2024-04-09 PROCEDURE — 1160F RVW MEDS BY RX/DR IN RCRD: CPT | Performed by: PHYSICIAN ASSISTANT

## 2024-04-09 PROCEDURE — 1159F MED LIST DOCD IN RCRD: CPT | Performed by: PHYSICIAN ASSISTANT

## 2024-04-09 RX ORDER — CLOBETASOL PROPIONATE 0.05 MG/G
GEL TOPICAL
COMMUNITY
Start: 2024-02-09 | End: 2024-04-09

## 2024-04-09 RX ORDER — BUPROPION HYDROCHLORIDE 300 MG/1
300 TABLET ORAL EVERY MORNING
Qty: 90 TABLET | Refills: 3 | Status: SHIPPED | OUTPATIENT
Start: 2024-04-09

## 2024-04-09 RX ORDER — ESTRADIOL 0.1 MG/G
CREAM VAGINAL
Qty: 42.5 G | Refills: 4 | Status: SHIPPED | OUTPATIENT
Start: 2024-04-09

## 2024-04-09 RX ORDER — NITROGLYCERIN 0.4 MG/1
TABLET SUBLINGUAL
COMMUNITY

## 2024-04-09 RX ORDER — SULFAMETHOXAZOLE AND TRIMETHOPRIM 800; 160 MG/1; MG/1
TABLET ORAL
COMMUNITY
End: 2024-04-09

## 2024-04-09 RX ORDER — FERROUS SULFATE 325(65) MG
TABLET ORAL
COMMUNITY
End: 2024-04-09

## 2024-04-09 NOTE — PROGRESS NOTES
Subjective   Chief Complaint   Patient presents with    Gynecologic Exam     No pap, Last pap done 20-WNL, MMG is scheduled, No complaints       Madelaine Brown is a 74 y.o. year old  presenting to be seen for her annual gynecological exam.   Has screening mammogram scheduled in   Desires refills on Wellbutrin  mg which she uses for anxiety  Also refill on estradiol vaginal cream  Previous hysterectomy and USO     Past Medical History:   Diagnosis Date    Diabetes     GERD (gastroesophageal reflux disease)     Heart disease     Hyperlipidemia     Hypertension     Normal vaginal Papanicolaou smear in patient with history of hysterectomy 10/17/2016    Osteoarthritis     Osteoporosis         Current Outpatient Medications:     ascorbic acid (VITAMIN C) 250 MG tablet, ascorbic acid (vitamin C) 250 mg tablet  TK 1 T PO QOD WITH IRON TABLET, Disp: , Rfl:     buPROPion XL (WELLBUTRIN XL) 300 MG 24 hr tablet, Take 1 tablet by mouth Every Morning., Disp: 90 tablet, Rfl: 3    estradiol (ESTRACE) 0.1 MG/GM vaginal cream, Insert 1 gram vaginally at hs twice weekly, Disp: 42.5 g, Rfl: 4    fluticasone (FLONASE) 50 MCG/ACT nasal spray, fluticasone propionate 50 mcg/actuation nasal spray,suspension, Disp: , Rfl:     lisinopril (PRINIVIL,ZESTRIL) 10 MG tablet, Take 1 tablet by mouth Daily., Disp: , Rfl:     MAGNESIUM PO, Take 330 mg by mouth Daily., Disp: , Rfl:     meclizine (ANTIVERT) 12.5 MG tablet, meclizine 12.5 mg tablet, Disp: , Rfl:     Multiple Vitamins-Minerals (HAIR SKIN AND NAILS FORMULA PO), Take  by mouth Daily., Disp: , Rfl:     Probiotic Product (PROBIOTIC PO), Take  by mouth Daily., Disp: , Rfl:     simvastatin (ZOCOR) 10 MG tablet, Take 1 tablet by mouth Every Night., Disp: , Rfl:     nitroglycerin (NITROSTAT) 0.4 MG SL tablet, nitroglycerin 0.4 mg sublingual tablet  DISSOLVE 1 TABLET UNDER THE TONGUE EVERY 5 MINUTES AS NEEDED FOR CHEST PAIN. MAX OF 3 TABLETS IN 15 MINUTES. CALL 911 IF NO  "RELIEF (Patient not taking: Reported on 4/9/2024), Disp: , Rfl:    No Known Allergies   Past Surgical History:   Procedure Laterality Date    HYSTERECTOMY      NOSE SURGERY      OOPHORECTOMY        Social History     Socioeconomic History    Marital status:    Tobacco Use    Smoking status: Never    Smokeless tobacco: Never   Vaping Use    Vaping status: Never Used   Substance and Sexual Activity    Alcohol use: Yes     Comment: rarely    Drug use: No    Sexual activity: Not Currently     Birth control/protection: Hysterectomy      Family History   Problem Relation Age of Onset    Diabetes Father     Heart attack Father     Osteoporosis Mother     Breast cancer Neg Hx        Review of Systems   Constitutional:  Negative for chills, diaphoresis and fever.   Gastrointestinal:  Negative for constipation, diarrhea, nausea and vomiting.   Genitourinary:  Negative for difficulty urinating, dysuria, pelvic pain, vaginal bleeding and vaginal discharge.           Objective   /68   Ht 160 cm (63\")   Wt 54.9 kg (121 lb)   BMI 21.43 kg/m²     Physical Exam  Exam conducted with a chaperone present.   Constitutional:       Appearance: Normal appearance. She is well-developed and well-groomed.   Eyes:      General: Lids are normal.      Extraocular Movements: Extraocular movements intact.      Conjunctiva/sclera: Conjunctivae normal.   Chest:   Breasts:     Breasts are symmetrical.      Right: No inverted nipple, mass, nipple discharge, skin change or tenderness.      Left: No inverted nipple, mass, nipple discharge, skin change or tenderness.   Abdominal:      General: There is no distension.      Palpations: Abdomen is soft.      Tenderness: There is no abdominal tenderness.   Genitourinary:     Labia:         Right: No rash, tenderness or lesion.         Left: No rash, tenderness or lesion.       Urethra: No prolapse, urethral pain, urethral swelling or urethral lesion.      Vagina: No vaginal discharge, " tenderness, bleeding or lesions.      Uterus: Absent.       Adnexa:         Right: No mass or tenderness.          Left: No mass or tenderness.        Rectum: No external hemorrhoid.      Comments: Vaginal tissue atrophic   Urethral meatus without lesion or discharge  Lymphadenopathy:      Upper Body:      Right upper body: No axillary adenopathy.      Left upper body: No axillary adenopathy.   Neurological:      Mental Status: She is alert.   Psychiatric:         Attention and Perception: Attention normal.         Mood and Affect: Mood normal.         Speech: Speech normal.         Behavior: Behavior is cooperative.            Result Review :                   Assessment and Plan  Diagnoses and all orders for this visit:    1. Routine gynecological examination (Primary)    2. Vaginal atrophy    3. Anxiety    4. Medication refill    5. Menopausal symptoms  -     estradiol (ESTRACE) 0.1 MG/GM vaginal cream; Insert 1 gram vaginally at hs twice weekly  Dispense: 42.5 g; Refill: 4  -     buPROPion XL (WELLBUTRIN XL) 300 MG 24 hr tablet; Take 1 tablet by mouth Every Morning.  Dispense: 90 tablet; Refill: 3      Patient Instructions   Self breast exam monthly  Annual mammogram  vit D 2000 mg daily  Regular excercise            This note was electronically signed.    Bren Nguyen PA-C   April 9, 2024

## 2024-06-13 ENCOUNTER — HOSPITAL ENCOUNTER (OUTPATIENT)
Dept: MAMMOGRAPHY | Facility: HOSPITAL | Age: 74
Discharge: HOME | End: 2024-06-13
Admitting: OBSTETRICS & GYNECOLOGY
Payer: MEDICARE

## 2024-06-13 DIAGNOSIS — Z12.31 ENCOUNTER FOR SCREENING MAMMOGRAM FOR BREAST CANCER: ICD-10-CM

## 2024-06-13 PROCEDURE — 77063 BREAST TOMOSYNTHESIS BI: CPT

## 2024-06-13 PROCEDURE — 77067 SCR MAMMO BI INCL CAD: CPT

## 2025-02-10 ENCOUNTER — HOSPITAL ENCOUNTER (OUTPATIENT)
Dept: CARDIOLOGY | Facility: HOSPITAL | Age: 75
Discharge: HOME | End: 2025-02-10
Admitting: INTERNAL MEDICINE
Payer: MEDICARE

## 2025-02-10 ENCOUNTER — OFFICE VISIT (OUTPATIENT)
Dept: CARDIOLOGY | Facility: CLINIC | Age: 75
End: 2025-02-10
Payer: MEDICARE

## 2025-02-10 VITALS
HEART RATE: 90 BPM | SYSTOLIC BLOOD PRESSURE: 122 MMHG | BODY MASS INDEX: 20.48 KG/M2 | WEIGHT: 115.6 LBS | HEIGHT: 63 IN | DIASTOLIC BLOOD PRESSURE: 84 MMHG

## 2025-02-10 DIAGNOSIS — I10 PRIMARY HYPERTENSION: ICD-10-CM

## 2025-02-10 DIAGNOSIS — R06.02 SHORTNESS OF BREATH: ICD-10-CM

## 2025-02-10 DIAGNOSIS — I44.7 LBBB (LEFT BUNDLE BRANCH BLOCK): ICD-10-CM

## 2025-02-10 DIAGNOSIS — I20.0 UNSTABLE ANGINA: ICD-10-CM

## 2025-02-10 DIAGNOSIS — R07.89 CHEST HEAVINESS: Primary | ICD-10-CM

## 2025-02-10 DIAGNOSIS — E78.00 HYPERCHOLESTEROLEMIA: ICD-10-CM

## 2025-02-10 LAB
AORTIC DIMENSIONLESS INDEX: 0.94 (DI)
AV MEAN PRESS GRAD SYS DOP V1V2: 2.9 MMHG
AV VMAX SYS DOP: 116.9 CM/SEC
BH CV ECHO MEAS - ACS: 1.8 CM
BH CV ECHO MEAS - AO MAX PG: 5.5 MMHG
BH CV ECHO MEAS - AO ROOT DIAM: 3 CM
BH CV ECHO MEAS - AO V2 VTI: 22 CM
BH CV ECHO MEAS - EDV(CUBED): 49.8 ML
BH CV ECHO MEAS - ESV(CUBED): 19.4 ML
BH CV ECHO MEAS - FS: 26.9 %
BH CV ECHO MEAS - IVS/LVPW: 1.23 CM
BH CV ECHO MEAS - IVSD: 0.97 CM
BH CV ECHO MEAS - LA DIMENSION: 2.6 CM
BH CV ECHO MEAS - LAT PEAK E' VEL: 7.2 CM/SEC
BH CV ECHO MEAS - LV MASS(C)D: 93.3 GRAMS
BH CV ECHO MEAS - LV MAX PG: 4.5 MMHG
BH CV ECHO MEAS - LV MEAN PG: 2.06 MMHG
BH CV ECHO MEAS - LV V1 MAX: 105.9 CM/SEC
BH CV ECHO MEAS - LV V1 VTI: 20.4 CM
BH CV ECHO MEAS - LVIDD: 3.7 CM
BH CV ECHO MEAS - LVIDS: 2.7 CM
BH CV ECHO MEAS - LVPWD: 0.79 CM
BH CV ECHO MEAS - MED PEAK E' VEL: 6.8 CM/SEC
BH CV ECHO MEAS - MV A MAX VEL: 28.3 CM/SEC
BH CV ECHO MEAS - MV DEC SLOPE: 680.3 CM/SEC2
BH CV ECHO MEAS - MV DEC TIME: 0.15 SEC
BH CV ECHO MEAS - MV E MAX VEL: 114 CM/SEC
BH CV ECHO MEAS - MV E/A: 4
BH CV ECHO MEAS - MV MAX PG: 6.8 MMHG
BH CV ECHO MEAS - MV MEAN PG: 1.97 MMHG
BH CV ECHO MEAS - MV P1/2T: 54.1 MSEC
BH CV ECHO MEAS - MV V2 VTI: 20.6 CM
BH CV ECHO MEAS - MVA(P1/2T): 4.1 CM2
BH CV ECHO MEAS - PA V2 MAX: 115 CM/SEC
BH CV ECHO MEAS - RAP SYSTOLE: 8 MMHG
BH CV ECHO MEAS - RV MAX PG: 3.2 MMHG
BH CV ECHO MEAS - RV V1 MAX: 89 CM/SEC
BH CV ECHO MEAS - RV V1 VTI: 16.3 CM
BH CV ECHO MEAS - RVSP: 23.2 MMHG
BH CV ECHO MEAS - TAPSE (>1.6): 3 CM
BH CV ECHO MEAS - TR MAX PG: 15.2 MMHG
BH CV ECHO MEAS - TR MAX VEL: 195.1 CM/SEC
BH CV ECHO MEASUREMENTS AVERAGE E/E' RATIO: 16.29
BH CV XLRA - TDI S': 15.1 CM/SEC
LV EF 3D SEGMENTATION: 64 %
SINUS: 2.8 CM

## 2025-02-10 PROCEDURE — 93306 TTE W/DOPPLER COMPLETE: CPT

## 2025-02-10 PROCEDURE — 93000 ELECTROCARDIOGRAM COMPLETE: CPT | Performed by: INTERNAL MEDICINE

## 2025-02-10 PROCEDURE — 1160F RVW MEDS BY RX/DR IN RCRD: CPT | Performed by: INTERNAL MEDICINE

## 2025-02-10 PROCEDURE — 1159F MED LIST DOCD IN RCRD: CPT | Performed by: INTERNAL MEDICINE

## 2025-02-10 PROCEDURE — 3074F SYST BP LT 130 MM HG: CPT | Performed by: INTERNAL MEDICINE

## 2025-02-10 PROCEDURE — 99204 OFFICE O/P NEW MOD 45 MIN: CPT | Performed by: INTERNAL MEDICINE

## 2025-02-10 PROCEDURE — 93306 TTE W/DOPPLER COMPLETE: CPT | Performed by: INTERNAL MEDICINE

## 2025-02-10 PROCEDURE — 3079F DIAST BP 80-89 MM HG: CPT | Performed by: INTERNAL MEDICINE

## 2025-02-10 RX ORDER — ASPIRIN 81 MG/1
81 TABLET ORAL DAILY
Qty: 30 TABLET | Refills: 6 | Status: SHIPPED | OUTPATIENT
Start: 2025-02-10

## 2025-02-10 RX ORDER — ONDANSETRON 8 MG/1
8 TABLET, FILM COATED ORAL EVERY 8 HOURS PRN
COMMUNITY

## 2025-02-10 RX ORDER — METOPROLOL SUCCINATE 25 MG/1
25 TABLET, EXTENDED RELEASE ORAL DAILY
Qty: 30 TABLET | Refills: 11 | Status: SHIPPED | OUTPATIENT
Start: 2025-02-10

## 2025-02-10 NOTE — LETTER
February 10, 2025     Evaristo Danielle MD  [address] [place] [id number]    Patient: Madelaine Brown   YOB: 1950   Date of Visit: 2/10/2025     Dear Evaristo Danielle MD:       Thank you for referring Madelaine Brown to me for evaluation. Below are the relevant portions of my assessment and plan of care.    If you have questions, please do not hesitate to call me. I look forward to following Madelaine along with you.         Sincerely,        Calvin Hernandes MD        CC: No Recipients    Calvin Hernandes MD  02/10/25 1225  Sign when Signing Visit  Chief Complaint   Patient presents with   • Establish Care     Wishing to transfer care here, had seen Dr Antione Austin in the past. See chart.    • Dizziness     Having lightheadedness for the last month. For the last week she started noticing dizziness, nausea and vomiting. Has noticed pain in mid chest, after eating feels like esophagus is packed and food will not go down.   • Fatigue     Had been walking dog 2-3 times daily, now feels really tired and has difficulty with walking dog for the last couple months. Use to be able to clean house all in one day and now she is unable to, has to stop and rest. Has not energy.   • Chest Pain     Has noticed pain in mid chest maybe once a year then in the last week has noticed more severe pain in upper and mid chest that wakes her at night. Has noticed 2 episodes with nausea and vomiting. Reports she called ambulance a week ago yet did not go to hospital. Had episode yesterday, with dizziness, heaviness in chest with nausea and vomiting. Having increase indigestion. At times has noticed jaw discomfort and ache.   • Shortness of Breath     Notices now with walking dog, walking up steps with exertion. Reports having shallow breath.   • Lab     Last labs 1/13/25 per PCP, see chart.        CARDIAC COMPLAINTS  chest pressure/discomfort, dyspnea, Dizziness, and fatigue      Subjective  Madelaine HOWELL  Stephanie is a 74 y.o. female came in today with her  for initial cardiac evaluation.  She has history of hypertension, hypercholesterolemia who has been having symptoms of chest heaviness, dizziness, shortness of breath and fatigue.  The symptoms started about a month ago.  Apparently she was given a prescription for Cozaar and advised to stop lisinopril.  By mistake she took both of them and felt dizzy and lightheaded for about 24 hours and then got better.  She did well for few weeks and then she started noticing significant nausea and vomiting associated with dizziness.  She also had severe chest pain for which EMS was called but by the time they came to the house her symptoms got better so she did not go to the hospital.  She also has been noticing increasing fatigue.  She used to walk her dog 2 or 3 times daily but now she feels tired and fatigued.  She is not able to her day-to-day activities without stopping and resting.  She also started noticing increasing shortness of breath on walking uphill.  She denies having any weight gain.  She denies having any palpitation.  She did have a near syncopal episode but never passed out.  She has seen a cardiologist few years ago.  She has undergone a stress test and echocardiogram and it showed normal LV function but there was a note saying that she has heavily calcified coronary arteries.  I do not have any CT scan or a calcium score available.  She also complain of having difficulty in eating she feels like the food getting stuck in the middle and she has the nausea and vomiting.  She had an EGD and colonoscopy done about a year ago and was told everything was normal.  She has no history of smoking or drinking alcohol.  Her father  with MI when he was in his mid 50's.    Past Surgical History:   Procedure Laterality Date   • CARDIOVASCULAR STRESS TEST  03/10/2021    Dr. Austin. Lexiscan- EF > 70%.. Negative. (? Heavily calcified coronaries)   • ECHO -  CONVERTED  10/16/2019    - EF 65%. RVSP- 10 mmHg   • HYSTERECTOMY     • NOSE SURGERY     • OOPHORECTOMY         Current Outpatient Medications   Medication Sig Dispense Refill   • ascorbic acid (VITAMIN C) 250 MG tablet ascorbic acid (vitamin C) 250 mg tablet   TK 1 T PO QOD WITH IRON TABLET     • buPROPion XL (WELLBUTRIN XL) 300 MG 24 hr tablet Take 1 tablet by mouth Every Morning. 90 tablet 3   • estradiol (ESTRACE) 0.1 MG/GM vaginal cream Insert 1 gram vaginally at hs twice weekly (Patient taking differently: Insert 1 gram vaginally at hs twice weekly prn) 42.5 g 4   • fluticasone (FLONASE) 50 MCG/ACT nasal spray Daily As Needed.     • MAGNESIUM CITRATE PO Take  by mouth Daily As Needed.     • meclizine (ANTIVERT) 12.5 MG tablet 2 tablets Daily As Needed.     • Multiple Vitamins-Minerals (HAIR SKIN AND NAILS FORMULA PO) Take  by mouth Daily.     • nitroglycerin (NITROSTAT) 0.4 MG SL tablet      • ondansetron (ZOFRAN) 8 MG tablet Take 1 tablet by mouth Every 8 (Eight) Hours As Needed for Nausea or Vomiting.     • Polyethylene Glycol 3350 (MIRALAX PO) Take  by mouth 2 (Two) Times a Day As Needed.     • PREBIOTIC PRODUCT PO Take  by mouth Daily.     • Probiotic Product (PROBIOTIC PO) Take  by mouth Daily.     • simvastatin (ZOCOR) 10 MG tablet Take 1 tablet by mouth Every Night.     • aspirin 81 MG EC tablet Take 1 tablet by mouth Daily. 30 tablet 6   • metoprolol succinate XL (TOPROL-XL) 25 MG 24 hr tablet Take 1 tablet by mouth Daily. 30 tablet 11     No current facility-administered medications for this visit.           ALLERGIES:  Patient has no known allergies.    Past Medical History:   Diagnosis Date   • Anxiety    • Diverticular disease    • Fibromyalgia    • GERD (gastroesophageal reflux disease)    • Heart disease    • Hyperlipidemia    • Hypertension    • Iron deficiency anemia    • Normal vaginal Papanicolaou smear in patient with history of hysterectomy 10/17/2016   • Osteoarthritis    •  "Osteoporosis    • Prediabetes        Social History     Tobacco Use   Smoking Status Never   • Passive exposure: Past   Smokeless Tobacco Never          Family History   Problem Relation Age of Onset   • Hyperlipidemia Mother    • Osteoporosis Mother    • Heart attack Father    • Hypertension Sister    • Hyperlipidemia Sister    • Diabetes Maternal Grandmother    • Hypertension Son    • Breast cancer Neg Hx        Review of Systems   Constitutional: Positive for malaise/fatigue. Negative for decreased appetite.   HENT:  Negative for congestion and sore throat.    Eyes:  Negative for blurred vision, double vision and visual disturbance.   Cardiovascular:  Positive for chest pain and dyspnea on exertion.   Respiratory:  Positive for shortness of breath. Negative for snoring.    Endocrine: Negative for cold intolerance and heat intolerance.   Hematologic/Lymphatic: Negative for adenopathy. Does not bruise/bleed easily.   Skin:  Negative for itching, nail changes and skin cancer.   Musculoskeletal:  Negative for arthritis and myalgias.   Gastrointestinal:  Positive for dysphagia. Negative for abdominal pain and heartburn.   Genitourinary:  Negative for bladder incontinence and frequency.   Neurological:  Negative for dizziness, seizures and vertigo.   Psychiatric/Behavioral:  Negative for altered mental status.    Allergic/Immunologic: Negative for environmental allergies and hives.       Diabetes- No  Thyroid- normal    Objective    /84 (BP Location: Left arm, Patient Position: Sitting)   Pulse 90   Ht 158.8 cm (62.5\")   Wt 52.4 kg (115 lb 9.6 oz)   BMI 20.81 kg/m²     Vitals and nursing note reviewed.   Constitutional:       Appearance: Healthy appearance. Not in distress.   Eyes:      Conjunctiva/sclera: Conjunctivae normal.      Pupils: Pupils are equal, round, and reactive to light.   HENT:      Head: Normocephalic.   Pulmonary:      Effort: Pulmonary effort is normal.      Breath sounds: Normal breath " sounds.   Cardiovascular:      PMI at left midclavicular line. Normal rate. Regular rhythm.      Murmurs: There is a grade 3/6 high frequency blowing holosystolic murmur at the apex.   Pulses:     Carotid: 3+ bilaterally.     Radial: 3+ bilaterally.     Femoral: 3+ bilaterally.     Dorsalis pedis: 3+ bilaterally.     Posterior tibial: 3+ bilaterally.  Abdominal:      General: Bowel sounds are normal.      Palpations: Abdomen is soft.   Musculoskeletal: Normal range of motion.      Cervical back: Normal range of motion and neck supple. Skin:     General: Skin is warm and dry.   Neurological:      Mental Status: Alert, oriented to person, place, and time and oriented to person, place and time.         ECG 12 Lead    Date/Time: 2/10/2025 12:24 PM  Performed by: Calvin Hernandes MD    Authorized by: Calvin Hernandes MD  Comparison: compared with previous ECG from 9/5/2019  Comparison to previous ECG: LBBB  Rhythm: sinus rhythm  Rate: normal  Conduction: left bundle branch block  Q waves: V1 and V2    QRS axis: normal    Clinical impression: abnormal EKG        @ASSESSMENT/PLAN@  BMI is within normal parameters. No other follow-up for BMI required.     Diagnoses and all orders for this visit:    1. Chest heaviness (Primary)  -     metoprolol succinate XL (TOPROL-XL) 25 MG 24 hr tablet; Take 1 tablet by mouth Daily.  Dispense: 30 tablet; Refill: 11  -     aspirin 81 MG EC tablet; Take 1 tablet by mouth Daily.  Dispense: 30 tablet; Refill: 6  -     [facility]; Future    2. Unstable angina    3. Primary hypertension  -     metoprolol succinate XL (TOPROL-XL) 25 MG 24 hr tablet; Take 1 tablet by mouth Daily.  Dispense: 30 tablet; Refill: 11    4. Hypercholesterolemia    5. Shortness of breath  -     Adult Transthoracic Echo Complete W/ Cont if Necessary Per Protocol; Future    6. LBBB (left bundle branch block)  -     Adult Transthoracic Echo Complete W/ Cont if Necessary Per Protocol; Future  -     [facility]; Future    At baseline her heart rate is upper limit of normal.  Her blood pressure is stable.  Her EKG showed normal sinus rhythm, left bundle branch block, Q waves in the anterior leads.  Her clinical examination reveals a BMI of 21.  She does have 3/6 mid systolic murmur at the mitral area.  She also has prominent carotid pulsation.    Regarding the chest heaviness, it is worrisome for unstable angina.  She does have an abnormal EKG and she has been having symptoms on very minimal exertion.  I advised her to be on aspirin 81 mg once a day.  She is also advised to take sublingual nitroglycerin as needed.  At this time I put her on a beta-blockers in the form of Toprol-XL at 25 mg.  I also scheduled her to undergo early cardiac catheterization.  She is also advised to go to the emergency room if the chest pain is not resolved in 10 to 15 minutes with sublingual nitroglycerin.  If the cardiac catheterization is not significant enough to cause this angina, then she may need early EGD and also an ultrasound of the gallbladder or HIDA scan    Her blood pressure seems to be well-controlled but the heart rate is upper limit of normal.  I advised her to stop lisinopril and start with the Toprol-XL 25 mg once a day.    Her hypercholesterolemia is being treated with Zocor at 10 mg.  I like to recheck the lipid profile.    Shortness of breath could be related to LV dysfunction.  I explained to her about the left bundle branch block and the possibility of discordance and LV dysfunction.  Will get an echocardiogram to evaluate the LV size, function and look for wall motion abnormality.  She also needs a cardiac catheterization    Regarding advanced directive, talked to her about living will and power of     Based on the results, further recommendations will be made               Electronically signed by Calvin Hernandes MD February 10, 2025 12:16 EST

## 2025-02-10 NOTE — PROGRESS NOTES
Chief Complaint   Patient presents with   • Establish Care     Wishing to transfer care here, had seen Dr Antione Austin in the past. See chart.    • Dizziness     Having lightheadedness for the last month. For the last week she started noticing dizziness, nausea and vomiting. Has noticed pain in mid chest, after eating feels like esophagus is packed and food will not go down.   • Fatigue     Had been walking dog 2-3 times daily, now feels really tired and has difficulty with walking dog for the last couple months. Use to be able to clean house all in one day and now she is unable to, has to stop and rest. Has not energy.   • Chest Pain     Has noticed pain in mid chest maybe once a year then in the last week has noticed more severe pain in upper and mid chest that wakes her at night. Has noticed 2 episodes with nausea and vomiting. Reports she called ambulance a week ago yet did not go to hospital. Had episode yesterday, with dizziness, heaviness in chest with nausea and vomiting. Having increase indigestion. At times has noticed jaw discomfort and ache.   • Shortness of Breath     Notices now with walking dog, walking up steps with exertion. Reports having shallow breath.   • Lab     Last labs 1/13/25 per PCP, see chart.        CARDIAC COMPLAINTS  chest pressure/discomfort, dyspnea, Dizziness, and fatigue      Subjective   Madelaine Brown is a 74 y.o. female came in today with her  for initial cardiac evaluation.  She has history of hypertension, hypercholesterolemia who has been having symptoms of chest heaviness, dizziness, shortness of breath and fatigue.  The symptoms started about a month ago.  Apparently she was given a prescription for Cozaar and advised to stop lisinopril.  By mistake she took both of them and felt dizzy and lightheaded for about 24 hours and then got better.  She did well for few weeks and then she started noticing significant nausea and vomiting associated with dizziness.  She also  had severe chest pain for which EMS was called but by the time they came to the house her symptoms got better so she did not go to the hospital.  She also has been noticing increasing fatigue.  She used to walk her dog 2 or 3 times daily but now she feels tired and fatigued.  She is not able to her day-to-day activities without stopping and resting.  She also started noticing increasing shortness of breath on walking uphill.  She denies having any weight gain.  She denies having any palpitation.  She did have a near syncopal episode but never passed out.  She has seen a cardiologist few years ago.  She has undergone a stress test and echocardiogram and it showed normal LV function but there was a note saying that she has heavily calcified coronary arteries.  I do not have any CT scan or a calcium score available.  She also complain of having difficulty in eating she feels like the food getting stuck in the middle and she has the nausea and vomiting.  She had an EGD and colonoscopy done about a year ago and was told everything was normal.  She has no history of smoking or drinking alcohol.  Her father  with MI when he was in his mid 50's.    Past Surgical History:   Procedure Laterality Date   • CARDIOVASCULAR STRESS TEST  03/10/2021    Dr. Austin. Lexiscan- EF > 70%.. Negative. (? Heavily calcified coronaries)   • ECHO - CONVERTED  10/16/2019    - EF 65%. RVSP- 10 mmHg   • HYSTERECTOMY     • NOSE SURGERY     • OOPHORECTOMY         Current Outpatient Medications   Medication Sig Dispense Refill   • ascorbic acid (VITAMIN C) 250 MG tablet ascorbic acid (vitamin C) 250 mg tablet   TK 1 T PO QOD WITH IRON TABLET     • buPROPion XL (WELLBUTRIN XL) 300 MG 24 hr tablet Take 1 tablet by mouth Every Morning. 90 tablet 3   • estradiol (ESTRACE) 0.1 MG/GM vaginal cream Insert 1 gram vaginally at hs twice weekly (Patient taking differently: Insert 1 gram vaginally at hs twice weekly prn) 42.5 g 4   • fluticasone  (FLONASE) 50 MCG/ACT nasal spray Daily As Needed.     • MAGNESIUM CITRATE PO Take  by mouth Daily As Needed.     • meclizine (ANTIVERT) 12.5 MG tablet 2 tablets Daily As Needed.     • Multiple Vitamins-Minerals (HAIR SKIN AND NAILS FORMULA PO) Take  by mouth Daily.     • nitroglycerin (NITROSTAT) 0.4 MG SL tablet      • ondansetron (ZOFRAN) 8 MG tablet Take 1 tablet by mouth Every 8 (Eight) Hours As Needed for Nausea or Vomiting.     • Polyethylene Glycol 3350 (MIRALAX PO) Take  by mouth 2 (Two) Times a Day As Needed.     • PREBIOTIC PRODUCT PO Take  by mouth Daily.     • Probiotic Product (PROBIOTIC PO) Take  by mouth Daily.     • simvastatin (ZOCOR) 10 MG tablet Take 1 tablet by mouth Every Night.     • aspirin 81 MG EC tablet Take 1 tablet by mouth Daily. 30 tablet 6   • metoprolol succinate XL (TOPROL-XL) 25 MG 24 hr tablet Take 1 tablet by mouth Daily. 30 tablet 11     No current facility-administered medications for this visit.           ALLERGIES:  Patient has no known allergies.    Past Medical History:   Diagnosis Date   • Anxiety    • Diverticular disease    • Fibromyalgia    • GERD (gastroesophageal reflux disease)    • Heart disease    • Hyperlipidemia    • Hypertension    • Iron deficiency anemia    • Normal vaginal Papanicolaou smear in patient with history of hysterectomy 10/17/2016   • Osteoarthritis    • Osteoporosis    • Prediabetes        Social History     Tobacco Use   Smoking Status Never   • Passive exposure: Past   Smokeless Tobacco Never          Family History   Problem Relation Age of Onset   • Hyperlipidemia Mother    • Osteoporosis Mother    • Heart attack Father    • Hypertension Sister    • Hyperlipidemia Sister    • Diabetes Maternal Grandmother    • Hypertension Son    • Breast cancer Neg Hx        Review of Systems   Constitutional: Positive for malaise/fatigue. Negative for decreased appetite.   HENT:  Negative for congestion and sore throat.    Eyes:  Negative for blurred vision,  "double vision and visual disturbance.   Cardiovascular:  Positive for chest pain and dyspnea on exertion.   Respiratory:  Positive for shortness of breath. Negative for snoring.    Endocrine: Negative for cold intolerance and heat intolerance.   Hematologic/Lymphatic: Negative for adenopathy. Does not bruise/bleed easily.   Skin:  Negative for itching, nail changes and skin cancer.   Musculoskeletal:  Negative for arthritis and myalgias.   Gastrointestinal:  Positive for dysphagia. Negative for abdominal pain and heartburn.   Genitourinary:  Negative for bladder incontinence and frequency.   Neurological:  Negative for dizziness, seizures and vertigo.   Psychiatric/Behavioral:  Negative for altered mental status.    Allergic/Immunologic: Negative for environmental allergies and hives.       Diabetes- No  Thyroid- normal    Objective     /84 (BP Location: Left arm, Patient Position: Sitting)   Pulse 90   Ht 158.8 cm (62.5\")   Wt 52.4 kg (115 lb 9.6 oz)   BMI 20.81 kg/m²     Vitals and nursing note reviewed.   Constitutional:       Appearance: Healthy appearance. Not in distress.   Eyes:      Conjunctiva/sclera: Conjunctivae normal.      Pupils: Pupils are equal, round, and reactive to light.   HENT:      Head: Normocephalic.   Pulmonary:      Effort: Pulmonary effort is normal.      Breath sounds: Normal breath sounds.   Cardiovascular:      PMI at left midclavicular line. Normal rate. Regular rhythm.      Murmurs: There is a grade 3/6 high frequency blowing holosystolic murmur at the apex.   Pulses:     Carotid: 3+ bilaterally.     Radial: 3+ bilaterally.     Femoral: 3+ bilaterally.     Dorsalis pedis: 3+ bilaterally.     Posterior tibial: 3+ bilaterally.  Abdominal:      General: Bowel sounds are normal.      Palpations: Abdomen is soft.   Musculoskeletal: Normal range of motion.      Cervical back: Normal range of motion and neck supple. Skin:     General: Skin is warm and dry.   Neurological:      " Mental Status: Alert, oriented to person, place, and time and oriented to person, place and time.         ECG 12 Lead    Date/Time: 2/10/2025 12:24 PM  Performed by: Calvin Hernandes MD    Authorized by: Calvin Hernandes MD  Comparison: compared with previous ECG from 9/5/2019  Comparison to previous ECG: LBBB  Rhythm: sinus rhythm  Rate: normal  Conduction: left bundle branch block  Q waves: V1 and V2    QRS axis: normal    Clinical impression: abnormal EKG        @ASSESSMENT/PLAN@  BMI is within normal parameters. No other follow-up for BMI required.     Diagnoses and all orders for this visit:    1. Chest heaviness (Primary)  -     metoprolol succinate XL (TOPROL-XL) 25 MG 24 hr tablet; Take 1 tablet by mouth Daily.  Dispense: 30 tablet; Refill: 11  -     aspirin 81 MG EC tablet; Take 1 tablet by mouth Daily.  Dispense: 30 tablet; Refill: 6  -     [facility]; Future    2. Unstable angina    3. Primary hypertension  -     metoprolol succinate XL (TOPROL-XL) 25 MG 24 hr tablet; Take 1 tablet by mouth Daily.  Dispense: 30 tablet; Refill: 11    4. Hypercholesterolemia    5. Shortness of breath  -     Adult Transthoracic Echo Complete W/ Cont if Necessary Per Protocol; Future    6. LBBB (left bundle branch block)  -     Adult Transthoracic Echo Complete W/ Cont if Necessary Per Protocol; Future  -     [facility]; Future    At baseline her heart rate is upper limit of normal.  Her blood pressure is stable.  Her EKG showed normal sinus rhythm, left bundle branch block, Q waves in the anterior leads.  Her clinical examination reveals a BMI of 21.  She does have 3/6 mid systolic murmur at the mitral area.  She also has prominent carotid pulsation.    Regarding the chest heaviness, it is worrisome for unstable angina.  She does have an abnormal EKG and she has been having symptoms on very minimal exertion.  I advised her to be on aspirin 81 mg once a day.  She is also advised to take sublingual  nitroglycerin as needed.  At this time I put her on a beta-blockers in the form of Toprol-XL at 25 mg.  I also scheduled her to undergo early cardiac catheterization.  She is also advised to go to the emergency room if the chest pain is not resolved in 10 to 15 minutes with sublingual nitroglycerin.  If the cardiac catheterization is not significant enough to cause this angina, then she may need early EGD and also an ultrasound of the gallbladder or HIDA scan    Her blood pressure seems to be well-controlled but the heart rate is upper limit of normal.  I advised her to stop lisinopril and start with the Toprol-XL 25 mg once a day.    Her hypercholesterolemia is being treated with Zocor at 10 mg.  I like to recheck the lipid profile.    Shortness of breath could be related to LV dysfunction.  I explained to her about the left bundle branch block and the possibility of discordance and LV dysfunction.  Will get an echocardiogram to evaluate the LV size, function and look for wall motion abnormality.  She also needs a cardiac catheterization    Regarding advanced directive, talked to her about living will and power of     Based on the results, further recommendations will be made               Electronically signed by Calvin Hernandes MD February 10, 2025 12:16 EST

## 2025-02-12 ENCOUNTER — OUTSIDE FACILITY SERVICE (OUTPATIENT)
Dept: CARDIOLOGY | Facility: CLINIC | Age: 75
End: 2025-02-12
Payer: MEDICARE

## 2025-02-13 ENCOUNTER — PATIENT ROUNDING (BHMG ONLY) (OUTPATIENT)
Dept: CARDIOLOGY | Facility: CLINIC | Age: 75
End: 2025-02-13
Payer: MEDICARE

## 2025-02-13 NOTE — PROGRESS NOTES
February 13, 2025    Hello, may I speak with Madelaine Brown?    My name is Christie at .       I am  with [facility]  [address] [place] [place] [address] [phone].    Before we get started may I verify your date of birth? 1950    I am calling to officially welcome you to our practice and ask about your recent visit. Is this a good time to talk? yes    Tell me about your visit with us. What things went well?  My guest experience was wonderful.  Everyone was so cheerful and everyone seemed so happy.  It was refreshing to come to an office like this. I felt like the care that I got was genuine and the people actually care about me as a person and a patient.        We're always looking for ways to make our patients' experiences even better. Do you have recommendations on ways we may improve?  no    Overall were you satisfied with your first visit to our practice? yes       I appreciate you taking the time to speak with me today. Is there anything else I can do for you? no      Thank you, and have a great day.

## 2025-03-05 DIAGNOSIS — N95.1 MENOPAUSAL SYMPTOMS: ICD-10-CM

## 2025-03-05 RX ORDER — BUPROPION HYDROCHLORIDE 300 MG/1
300 TABLET ORAL EVERY MORNING
Qty: 90 TABLET | Refills: 0 | Status: SHIPPED | OUTPATIENT
Start: 2025-03-05

## 2025-05-30 DIAGNOSIS — N95.1 MENOPAUSAL SYMPTOMS: ICD-10-CM

## 2025-05-30 RX ORDER — BUPROPION HYDROCHLORIDE 300 MG/1
300 TABLET ORAL EVERY MORNING
Qty: 90 TABLET | Refills: 0 | Status: SHIPPED | OUTPATIENT
Start: 2025-05-30

## 2025-08-28 DIAGNOSIS — N95.1 MENOPAUSAL SYMPTOMS: ICD-10-CM

## 2025-08-28 RX ORDER — BUPROPION HYDROCHLORIDE 300 MG/1
300 TABLET ORAL EVERY MORNING
Qty: 90 TABLET | Refills: 2 | Status: SHIPPED | OUTPATIENT
Start: 2025-08-28